# Patient Record
Sex: FEMALE | Race: OTHER | HISPANIC OR LATINO | URBAN - METROPOLITAN AREA
[De-identification: names, ages, dates, MRNs, and addresses within clinical notes are randomized per-mention and may not be internally consistent; named-entity substitution may affect disease eponyms.]

---

## 2020-11-23 ENCOUNTER — APPOINTMENT (EMERGENCY)
Dept: RADIOLOGY | Facility: HOSPITAL | Age: 25
End: 2020-11-23

## 2020-11-23 ENCOUNTER — HOSPITAL ENCOUNTER (EMERGENCY)
Facility: HOSPITAL | Age: 25
Discharge: HOME | End: 2020-11-23
Attending: EMERGENCY MEDICINE

## 2020-11-23 VITALS
SYSTOLIC BLOOD PRESSURE: 123 MMHG | OXYGEN SATURATION: 99 % | WEIGHT: 167.55 LBS | RESPIRATION RATE: 16 BRPM | DIASTOLIC BLOOD PRESSURE: 65 MMHG | TEMPERATURE: 98 F | HEART RATE: 84 BPM

## 2020-11-23 DIAGNOSIS — R10.9 ABDOMINAL PAIN: ICD-10-CM

## 2020-11-23 DIAGNOSIS — Z3A.01 LESS THAN 8 WEEKS GESTATION OF PREGNANCY: Primary | ICD-10-CM

## 2020-11-23 LAB
ALBUMIN SERPL BCP-MCNC: 3.7 G/DL (ref 3.5–5)
ALP SERPL-CCNC: 81 U/L (ref 46–116)
ALT SERPL W P-5'-P-CCNC: 19 U/L (ref 12–78)
ANION GAP SERPL CALCULATED.3IONS-SCNC: 5 MMOL/L (ref 4–13)
AST SERPL W P-5'-P-CCNC: 13 U/L (ref 5–45)
BASOPHILS # BLD AUTO: 0.07 THOUSANDS/ΜL (ref 0–0.1)
BASOPHILS NFR BLD AUTO: 1 % (ref 0–1)
BILIRUB SERPL-MCNC: 0.33 MG/DL (ref 0.2–1)
BILIRUB UR QL STRIP: NEGATIVE
BUN SERPL-MCNC: 8 MG/DL (ref 5–25)
CALCIUM SERPL-MCNC: 9 MG/DL (ref 8.3–10.1)
CHLORIDE SERPL-SCNC: 106 MMOL/L (ref 100–108)
CLARITY UR: CLEAR
CO2 SERPL-SCNC: 26 MMOL/L (ref 21–32)
COLOR UR: YELLOW
COLOR, POC: NORMAL
CREAT SERPL-MCNC: 0.78 MG/DL (ref 0.6–1.3)
EOSINOPHIL # BLD AUTO: 0.28 THOUSAND/ΜL (ref 0–0.61)
EOSINOPHIL NFR BLD AUTO: 2 % (ref 0–6)
ERYTHROCYTE [DISTWIDTH] IN BLOOD BY AUTOMATED COUNT: 11.9 % (ref 11.6–15.1)
EXT PREG TEST URINE: POSITIVE
EXT. CONTROL ED NAV: NORMAL
GFR SERPL CREATININE-BSD FRML MDRD: 105 ML/MIN/1.73SQ M
GLUCOSE SERPL-MCNC: 79 MG/DL (ref 65–140)
GLUCOSE UR STRIP-MCNC: NEGATIVE MG/DL
HCT VFR BLD AUTO: 39.9 % (ref 34.8–46.1)
HGB BLD-MCNC: 12.9 G/DL (ref 11.5–15.4)
HGB UR QL STRIP.AUTO: NEGATIVE
IMM GRANULOCYTES # BLD AUTO: 0.02 THOUSAND/UL (ref 0–0.2)
IMM GRANULOCYTES NFR BLD AUTO: 0 % (ref 0–2)
KETONES UR STRIP-MCNC: NEGATIVE MG/DL
LEUKOCYTE ESTERASE UR QL STRIP: NEGATIVE
LIPASE SERPL-CCNC: 122 U/L (ref 73–393)
LYMPHOCYTES # BLD AUTO: 4 THOUSANDS/ΜL (ref 0.6–4.47)
LYMPHOCYTES NFR BLD AUTO: 34 % (ref 14–44)
MCH RBC QN AUTO: 30.9 PG (ref 26.8–34.3)
MCHC RBC AUTO-ENTMCNC: 32.3 G/DL (ref 31.4–37.4)
MCV RBC AUTO: 96 FL (ref 82–98)
MONOCYTES # BLD AUTO: 1.2 THOUSAND/ΜL (ref 0.17–1.22)
MONOCYTES NFR BLD AUTO: 10 % (ref 4–12)
NEUTROPHILS # BLD AUTO: 6.26 THOUSANDS/ΜL (ref 1.85–7.62)
NEUTS SEG NFR BLD AUTO: 53 % (ref 43–75)
NITRITE UR QL STRIP: NEGATIVE
NRBC BLD AUTO-RTO: 0 /100 WBCS
PH UR STRIP.AUTO: 6 [PH] (ref 4.5–8)
PLATELET # BLD AUTO: 278 THOUSANDS/UL (ref 149–390)
PMV BLD AUTO: 11.3 FL (ref 8.9–12.7)
POTASSIUM SERPL-SCNC: 3.6 MMOL/L (ref 3.5–5.3)
PROT SERPL-MCNC: 7.3 G/DL (ref 6.4–8.2)
PROT UR STRIP-MCNC: NEGATIVE MG/DL
RBC # BLD AUTO: 4.17 MILLION/UL (ref 3.81–5.12)
SODIUM SERPL-SCNC: 137 MMOL/L (ref 136–145)
SP GR UR STRIP.AUTO: 1.01 (ref 1–1.03)
TSH SERPL DL<=0.05 MIU/L-ACNC: 1.93 UIU/ML (ref 0.36–3.74)
UROBILINOGEN UR QL STRIP.AUTO: 0.2 E.U./DL
WBC # BLD AUTO: 11.83 THOUSAND/UL (ref 4.31–10.16)

## 2020-11-23 PROCEDURE — 84443 ASSAY THYROID STIM HORMONE: CPT | Performed by: EMERGENCY MEDICINE

## 2020-11-23 PROCEDURE — 93005 ELECTROCARDIOGRAM TRACING: CPT

## 2020-11-23 PROCEDURE — 76801 OB US < 14 WKS SINGLE FETUS: CPT

## 2020-11-23 PROCEDURE — 99284 EMERGENCY DEPT VISIT MOD MDM: CPT

## 2020-11-23 PROCEDURE — 36415 COLL VENOUS BLD VENIPUNCTURE: CPT | Performed by: EMERGENCY MEDICINE

## 2020-11-23 PROCEDURE — 99282 EMERGENCY DEPT VISIT SF MDM: CPT | Performed by: EMERGENCY MEDICINE

## 2020-11-23 PROCEDURE — 76815 OB US LIMITED FETUS(S): CPT | Performed by: EMERGENCY MEDICINE

## 2020-11-23 PROCEDURE — 83690 ASSAY OF LIPASE: CPT | Performed by: EMERGENCY MEDICINE

## 2020-11-23 PROCEDURE — 85025 COMPLETE CBC W/AUTO DIFF WBC: CPT | Performed by: EMERGENCY MEDICINE

## 2020-11-23 PROCEDURE — 81025 URINE PREGNANCY TEST: CPT | Performed by: EMERGENCY MEDICINE

## 2020-11-23 PROCEDURE — 81003 URINALYSIS AUTO W/O SCOPE: CPT

## 2020-11-23 PROCEDURE — 80053 COMPREHEN METABOLIC PANEL: CPT | Performed by: EMERGENCY MEDICINE

## 2020-11-24 LAB
ATRIAL RATE: 84 BPM
P AXIS: 71 DEGREES
PR INTERVAL: 124 MS
QRS AXIS: 78 DEGREES
QRSD INTERVAL: 74 MS
QT INTERVAL: 348 MS
QTC INTERVAL: 411 MS
T WAVE AXIS: 38 DEGREES
VENTRICULAR RATE: 84 BPM

## 2020-11-24 PROCEDURE — 93010 ELECTROCARDIOGRAM REPORT: CPT | Performed by: INTERNAL MEDICINE

## 2021-02-10 ENCOUNTER — APPOINTMENT (OUTPATIENT)
Dept: LAB | Facility: HOSPITAL | Age: 26
End: 2021-02-10

## 2021-02-10 ENCOUNTER — INITIAL PRENATAL (OUTPATIENT)
Dept: OBGYN CLINIC | Facility: CLINIC | Age: 26
End: 2021-02-10

## 2021-02-10 VITALS
HEIGHT: 66 IN | BODY MASS INDEX: 26.68 KG/M2 | WEIGHT: 166 LBS | SYSTOLIC BLOOD PRESSURE: 107 MMHG | HEART RATE: 102 BPM | DIASTOLIC BLOOD PRESSURE: 72 MMHG

## 2021-02-10 DIAGNOSIS — Z3A.16 16 WEEKS GESTATION OF PREGNANCY: ICD-10-CM

## 2021-02-10 DIAGNOSIS — Z34.92 PREGNANT AND NOT YET DELIVERED IN SECOND TRIMESTER: ICD-10-CM

## 2021-02-10 DIAGNOSIS — Z78.9 LANGUAGE BARRIER: ICD-10-CM

## 2021-02-10 DIAGNOSIS — O09.32 LIMITED PRENATAL CARE IN SECOND TRIMESTER: ICD-10-CM

## 2021-02-10 DIAGNOSIS — O09.32 LIMITED PRENATAL CARE IN SECOND TRIMESTER: Primary | ICD-10-CM

## 2021-02-10 LAB
ABO GROUP BLD: NORMAL
BACTERIA UR QL AUTO: ABNORMAL /HPF
BASOPHILS # BLD AUTO: 0.04 THOUSANDS/ΜL (ref 0–0.1)
BASOPHILS NFR BLD AUTO: 0 % (ref 0–1)
BILIRUB UR QL STRIP: NEGATIVE
BLD GP AB SCN SERPL QL: NEGATIVE
CLARITY UR: ABNORMAL
COLOR UR: YELLOW
EOSINOPHIL # BLD AUTO: 0.11 THOUSAND/ΜL (ref 0–0.61)
EOSINOPHIL NFR BLD AUTO: 1 % (ref 0–6)
ERYTHROCYTE [DISTWIDTH] IN BLOOD BY AUTOMATED COUNT: 12.5 % (ref 11.6–15.1)
GLUCOSE UR STRIP-MCNC: NEGATIVE MG/DL
HBV SURFACE AG SER QL: NORMAL
HCT VFR BLD AUTO: 37.3 % (ref 34.8–46.1)
HGB BLD-MCNC: 12.4 G/DL (ref 11.5–15.4)
HGB UR QL STRIP.AUTO: NEGATIVE
HYALINE CASTS #/AREA URNS LPF: ABNORMAL /LPF
IMM GRANULOCYTES # BLD AUTO: 0.05 THOUSAND/UL (ref 0–0.2)
IMM GRANULOCYTES NFR BLD AUTO: 1 % (ref 0–2)
KETONES UR STRIP-MCNC: NEGATIVE MG/DL
LEUKOCYTE ESTERASE UR QL STRIP: ABNORMAL
LYMPHOCYTES # BLD AUTO: 1.77 THOUSANDS/ΜL (ref 0.6–4.47)
LYMPHOCYTES NFR BLD AUTO: 18 % (ref 14–44)
MCH RBC QN AUTO: 31.6 PG (ref 26.8–34.3)
MCHC RBC AUTO-ENTMCNC: 33.2 G/DL (ref 31.4–37.4)
MCV RBC AUTO: 95 FL (ref 82–98)
MONOCYTES # BLD AUTO: 0.86 THOUSAND/ΜL (ref 0.17–1.22)
MONOCYTES NFR BLD AUTO: 9 % (ref 4–12)
NEUTROPHILS # BLD AUTO: 7.24 THOUSANDS/ΜL (ref 1.85–7.62)
NEUTS SEG NFR BLD AUTO: 71 % (ref 43–75)
NITRITE UR QL STRIP: NEGATIVE
NON-SQ EPI CELLS URNS QL MICRO: ABNORMAL /HPF
NRBC BLD AUTO-RTO: 0 /100 WBCS
PH UR STRIP.AUTO: 6.5 [PH]
PLATELET # BLD AUTO: 258 THOUSANDS/UL (ref 149–390)
PMV BLD AUTO: 11 FL (ref 8.9–12.7)
PROT UR STRIP-MCNC: NEGATIVE MG/DL
RBC # BLD AUTO: 3.93 MILLION/UL (ref 3.81–5.12)
RBC #/AREA URNS AUTO: ABNORMAL /HPF
RH BLD: POSITIVE
RPR SER QL: NORMAL
RUBV IGG SERPL IA-ACNC: 172.8 IU/ML
SP GR UR STRIP.AUTO: 1.01 (ref 1–1.03)
SPECIMEN EXPIRATION DATE: NORMAL
UROBILINOGEN UR QL STRIP.AUTO: 0.2 E.U./DL
WBC # BLD AUTO: 10.07 THOUSAND/UL (ref 4.31–10.16)
WBC #/AREA URNS AUTO: ABNORMAL /HPF

## 2021-02-10 PROCEDURE — 99211 OFF/OP EST MAY X REQ PHY/QHP: CPT

## 2021-02-10 PROCEDURE — 86336 INHIBIN A: CPT

## 2021-02-10 PROCEDURE — 81001 URINALYSIS AUTO W/SCOPE: CPT

## 2021-02-10 PROCEDURE — 36415 COLL VENOUS BLD VENIPUNCTURE: CPT

## 2021-02-10 PROCEDURE — 84702 CHORIONIC GONADOTROPIN TEST: CPT

## 2021-02-10 PROCEDURE — 87086 URINE CULTURE/COLONY COUNT: CPT

## 2021-02-10 PROCEDURE — 82105 ALPHA-FETOPROTEIN SERUM: CPT

## 2021-02-10 PROCEDURE — 87077 CULTURE AEROBIC IDENTIFY: CPT

## 2021-02-10 PROCEDURE — 80081 OBSTETRIC PANEL INC HIV TSTG: CPT

## 2021-02-10 PROCEDURE — 83020 HEMOGLOBIN ELECTROPHORESIS: CPT

## 2021-02-10 PROCEDURE — 82677 ASSAY OF ESTRIOL: CPT

## 2021-02-10 NOTE — LETTER
Proof of Pregnancy Letter    Mirna Rushing Ramon  1995  [address] [address]        02/10/21      Mirna Rushing is a patient at our facility  Tania Muniz Estimated Date of Delivery: 07/24/2021     Any questions or concerns, please feel free to contact our office      Sincerely,    1 [facility]    [address]   [place], [address] [facility]   [phone]

## 2021-02-10 NOTE — LETTER
Dentist Letter    Tania 82  1995  [address] [place] [address] Road [address]          02/10/21    We have had several requests from local dentist requesting permission to perform procedures on our patients who are pregnant  We wish to respond with this letter regarding some of the more routine procedures that we have been asked about  The following procedures may be performed on our obstetric patients:   1  Administration of local anesthesia   2  Administration of antibiotics such as PCN, Ampicillin, and Erythromycin  3  Administration of pain medications such as Tylenol, Tylenol with codeine, and if needed Percocet  4  Shielded X-rays    Should you have any questions, please do not hesitate to contact at [phone]          Sincerely,    1 [facility]   [phone]

## 2021-02-10 NOTE — LETTER
Work Letter    Mirna Serna  [address]  1995  [address] [address]    Dear Alexandra Mcmahon,      02/10/21        Your employee is a patient at [facility]  We recommend that all pregnant women:    1  Have a well-ventilated workspace  2  Wear low-heeled shoes  3  Work no more than 40 hours per week  4  Have a 15 minute break every 2 hours and at least 30 minutes for a meal break  5  Use good body mechanics by bending at your knees to avoid back strain and lift no more than 20 pounds without assistance  Will need assistance with lifting over 20 lbs  6  Have ready access to bathrooms and water  She may continue to work until her due date unless medical complications arise  We anticipate she may return to work in 6-8 weeks after delivery       Sincerely,  1 Tamir Martínez

## 2021-02-10 NOTE — PROGRESS NOTES
OB INTAKE INTERVIEW  Pt presents for OB intake    OB History    Para Term  AB Living   2 1 1     1   SAB TAB Ectopic Multiple Live Births           1      # Outcome Date GA Lbr Gallo/2nd Weight Sex Delivery Anes PTL Lv   2 Current            1 Term 02/20/15 38w0d   F Vag-Spont None N CAROL      Birth Comments: BRAZIL       Obstetric Comments   MENSTRUAL CYCLE: 12     Hx of  delivery prior to 36 weeks 6 days:  No  Last Menstrual Period:    No LMP recorded (lmp unknown)  Patient is pregnant  Ultrasound date: 2020  5 weeks 6 days     Estimated Date of Delivery: 2021  by US  H&P visit scheduled  2021 @ 1:15 pm  with Dr Shay Moss  Last pap smear: No records, will collect at H&P    Current Issues:  Constipation :   No  Headaches :   Yes   Cramping:  No  Spotting :   No  PICA cravings :  No  FOB Involved:   Yes  Planned pregnancy:  Yes  I have these concerns about this prenatal patient:   1  Limited prenatal care in second trimester  ED visit on 2020 which a formal ultrasound was performed  Per ultrasound pt was 5 weeks 6 days with SADIQ of 2021     - Prenatal Panel; Future  - Hemoglobin Electrophoresis; Future  - QUAD Screen; Future    2  16 weeks gestation of pregnancy  - Ambulatory referral to social work care management program; Future  - Ambulatory Referral to Maternal Fetal Medicine; Future   Level 2 (dating): 03/15/2021 @ 2:30 pm     3  Pregnant and not yet delivered in second trimester    4  Language barrier  [place]     Interview education   [facility] Pregnancy Ess[facility] reviewed and discussed    [facility] Handout   [facility] MFM Handout   Discussed genetic testing   Prenatal lab work: Scripts printed and given to pt   Influenza vaccine given today: No   Discussed Tdap vaccine  Immunizations: There is no immunization history on file for this patient    Depression Screening Follow-up Plan: Patient's depression screening was negative with an [place] score of  0  Clinically patient does not have depression  No treatment is required     Nurse/Family Partnership- referral placed:  No  BMI Counseling: Body mass index is 26 79 kg/m²  Tobacco Cessation Counseling: non-smoker  Infection Screening: Does the pt have a hx of MRSA? No  The patient was oriented to our practice and all questions were answered    Interviewed by: Jackie Dodd RN 02/10/21

## 2021-02-10 NOTE — LETTER
[facility] Letter    Mirna Inman [place] Ramon  1995  [address] [address]       02/10/21          Mirna Crespo is a patient and under our care in our office  Brian 54 Estimated Date of Delivery: 07/24/2021  Any questions or concerns feel free to contact our office       Thank you,  [address]  [id number] [facility]/Darian Alfaro 15  [address] [facility]/Patrick Atkins 82  [facility]   400 [facility]  [phone]

## 2021-02-10 NOTE — PATIENT INSTRUCTIONS
1 Tamir Martínez would like to say Congratulations on your pregnancy! We are happy that you have chosen us to be your health care provider during your pregnancy  Your health, the health of your unborn child (children) and your family is important to us  Now, more than ever, your health will be most important to you  Your baby's growth and progress can be affected by how well you take care of yourself  It's a good idea to plan ahead  It is a known fact that women who receive care early in pregnancy and throughout their pregnancy have healthier babies  Visits will be scheduled for you throughout your pregnancy  It is your duty to keep your appointments and follow directions for your care  The number of visits will be decided by your doctor  Don't be afraid to ask questions  Talk with your nurses and providers about your plans for birth and any concerns you may have  Maternal Fetal Medicine (MFM) at [phone] t  o Sequential Screening   (optional)   - Done between 15 - 13 weeks gestation   Ultrasound    Risks for Trisomy 25 and 24   Spina Bifida (second part), after 16 weeks, will be explained by the MFM office  o QUAD screen, if applicable   o Anatomy Scan   - 20 week ultrasound  - Gender may be revealed, your preference  - 1 hour appointment, only 1 support person allowed, NO children   Blood work : Please complete prior to your H&P appointment  o You do NOT have to fast for any blood work unless instructed     - CBC, Blood type and antibody screen, Urinalysis, Random glucose level, HIV, Syphilis, Hepatitis B   IF applicable: 1 hour Glucola or Hemoglobin A1C, 24 hour urine, CMP, Protein creatinine ratio   History &Physical: H&P appointment   o Physical exam  o Pelvic exam: GC/CT testing  o If needed: Pap smear  - Routine prenatal   o Visits every 4 weeks until 28 weeks  o At your prenatal visits we will:   - Check baby's heart beat and measure your growing belly, collect a urine sample, weight, and blood pressure   - 28 weeks:  Prenatal visits will be every 2 weeks  - TDaP vaccine, Complete blood count, RPR  o Blood type and antibodies   - Rhogham may be given at this time, if needed  o Gestational Diabetes, 1 hr Glucola test (non-fasting)  - If you fail the 1 hr Glucola, a 3 hr Glucola will be ordered by your provider  The 3 hr Glucola test is fasting     - If you fail the 3 hr Glucola test, you will be referred to the Maternal Fetal Medicine diabetic education team  They can further assist you by calling [phone]   Start performing daily fetal kick counts   Prenatal classes  o Prepared childbirth Education classes, Breastfeeding, [place] [facility], and Infant/Child CPR  - Call [facility]  [phone] to sign up   - 36 weeks: Prenatal visits start to become weekly  o Group Beta Strep (GBS) will be collected  - This is done by a vaginal swab in the office  o Chlamydia/Gonorrhea testing will be obtained as well, if applicable   Prepare for delivery  - Back your bag and belongings for the hospital  - Familiarize yourself with visiting guidelines   RELAX:  o Enjoy the last few weeks of your pregnancy  Pregnancy   What you need to know about pregnancy:    o A normal pregnancy lasts about 40 weeks  o The first trimester lasts from your last period through the 12th week of pregnancy  o The second trimester lasts from the 13th week of your pregnancy through the 23rd week  o The third trimester lasts from your 24th week of pregnancy until your baby is born  Body changes that may occur during your pregnancy:  1  Breast changes you will experience include tenderness and tingling during the early part of your pregnancy  Your breasts will become larger  You may need to use a support bra  You may see a thin, yellow fluid, called colostrum, leak from your nipples during the second trimester   Colostrum is a liquid that changes to milk about 3 days after you give birth  2  Skin changes and stretch marks  may occur during your pregnancy  You may have red marks, called stretch marks, on your skin  Stretch marks will usually fade after pregnancy  Use lotion if your skin is dry and itchy  The skin on your face, around your nipples, and below your belly button may darken  Most of the time, your skin will return to its normal color after your baby is born  3  Morning sickness  is nausea and vomiting that can happen at any time of day  Avoid fatty and spicy foods  Eat small meals throughout the day instead of large meals  Myrna may help to decrease nausea  Ask your healthcare provider about other ways of decreasing nausea and vomiting  4  Heartburn  may be caused by changes in your hormones during pregnancy  Your growing uterus may also push your stomach upward and force stomach acid to back up into your esophagus  Eat 4 or 5 small meals each day instead of large meals  Avoid spicy foods  Avoid eating right before bedtime  5  Constipation  may develop during your pregnancy  To treat constipation, eat foods high in fiber such as fiber cereals, beans, fruits, vegetables, whole-grain breads, and prune juice  Get regular exercise and drink plenty of water  Your healthcare provider may also suggest a fiber supplement to soften your bowel movements  Talk to your healthcare provider before you use any medicines to decrease constipation  6  Hemorrhoids  are enlarged veins in the rectal area  They may cause pain, itching, and bright red bleeding from your rectum  To decrease your risk of hemorrhoids, prevent constipation and do not strain to have a bowel movement  If you have hemorrhoids, soak in a tub of warm water to ease discomfort  Ask your healthcare provider how you can treat hemorrhoids  7  Leg cramps and swelling  may be caused by low calcium levels or the added weight of pregnancy  Raise your legs above the level of your heart to decrease swelling   During a leg cramp, stretch or massage the muscle that has the cramp  Heat may help decrease pain and muscle spasms  Apply heat on your muscle for 20 to 30 minutes every 2 hours for as many days as directed  8  Back pain  may occur as your baby grows  Do not stand for long periods of time or lift heavy items  Use good posture while you stand, squat, or bend  Wear low-heeled shoes with good support  Rest may also help to relieve back pain  Ask your healthcare provider about exercises you can do to strengthen your back muscles  Stay healthy during your pregnancy     Eat a variety of healthy foods  Healthy foods include fruits, vegetables, whole-grain breads, low-fat dairy foods, beans, lean meats, and fish  Drink liquids as directed  Ask how much liquid to drink each day and which liquids are best for you   Caffeine: It is not clear how caffeine affects pregnancy  Limit your intake of caffeine to avoid possible health problems  o Limit caffeine to less than 200 milligrams each day  - Caffeine may be found in coffee, tea, cola, sports drinks, and chocolate   Foods that contain mercury:  Mercury is naturally found in almost all types of fish and shellfish  Some types of fish absorb higher levels of mercury that can be harmful to an unborn baby  Eat only fish and shellfish that are low in mercury  o Limit your intake of fish to 2 servings each week  - Choose fish low in mercury such as canned light tuna, shrimp, crab, salmon, cod, or tilapia   Do not  eat fish high in mercury such as swordfish, tilefish, cassy mackerel, and shark   - Each week, you may eat up to 12 ounces of fish or shellfish that have low levels of mercury  These include shrimp, canned light tuna, salmon, pollock, and catfish    - Eat only 6 ounces of albacore (white) tuna per week  Albacore tuna has more mercury than canned tuna   Take prenatal vitamins as directed    Your need for certain vitamins and minerals, such as folic acid, increases during pregnancy  Prenatal vitamins provide some of the extra vitamins and minerals you need  Prenatal vitamins may also help to decrease the risk of certain birth defects   Weight: Ask how much weight you should gain during your pregnancy  Too much or too little weight gain can be unhealthy for you and your baby   Exercise: Talk to your healthcare provider about exercise  Moderate exercise can help you stay fit  Your healthcare provider will help you plan an exercise program that is safe for you during pregnancy  o Drink plenty of fluids while exercising to stay hydrated  Be careful to avoid overheating  o AVOID exercises that can make you lose your balance    o Activities that can put your baby at risk i e  horseback riding, scuba diving, skiing or snowboarding  o After your first trimester, avoid exercises that require you to lay flat on your back  o AVOID exceeding a heart rate greater than 140 beats per minute  As long as you are able to hold a conversation while exercising your heart rate is likely acceptable   ALWAYS wear your seat belt   o The shoulder belt should lay across your chest (between your breasts) and away from your neck    o Secure the lap belt below your belly so that it fits snugly across your hips and pelvic bone   Perform Kegel exercise  o Imagine yourself stopping the flow of urine, barbara the muscles of your pelvic floor  Hold that contraction for 10 seconds and release  - They can be repeated 10-20 times per day   Do not smoke  If you smoke, it is never too late to quit  Smoking increases your risk of a miscarriage and other health problems during your pregnancy  Smoking can cause your baby to be born too early or weigh less at birth  Ask your healthcare provider for information if you need help quitting   Do not drink alcohol  Alcohol passes from your body to your baby through the placenta   It can affect your baby's brain development and cause fetal alcohol syndrome (FAS)  FAS is a group of conditions that causes mental, behavior, and growth problems  o Alcohol:  Do not drink alcohol during pregnancy  Alcohol can increase your risk of a miscarriage (losing your baby)   Never use illegal or street drugs (such as marijuana or cocaine) while you are pregnant  Safety tips:   Avoid hot tubs and saunas  Do not use a hot tub or sauna while you are pregnant, especially during your first trimester  Hot tubs and saunas may raise your baby's temperature and increase the risk of birth defects   Avoid toxoplasmosis  This is an infection caused by eating raw meat or being around infected cat feces  It can cause birth defects, miscarriages, and other problems  Wash your hands after you touch raw meat  Make sure any meat is well-cooked before you eat it  Avoid raw eggs and unpasteurized milk  Use gloves or ask someone else to clean your cat's litter box while you are pregnant   Ask your healthcare provider about travel  The most comfortable time to travel is during the second trimester  Ask your healthcare provider if you can travel after 36 weeks  You may not be able to travel in an airplane after 36 weeks  He may also recommend that you avoid long road trips  Pregnancy Diet  WHAT YOU NEED TO KNOW:   What is a healthy diet during pregnancy? A healthy diet during pregnancy is a meal plan that provides the amount of calories and nutrients you need during pregnancy  Your body needs extra calories and nutrients to support your growing baby  You need to gain the right amount of weight for a healthy baby and pregnancy  Babies born at a healthy weight have a lower risk of certain health problems at birth and later in life  A healthy diet may help you avoid gaining too much weight  Too much weight gain may cause problems for you during your pregnancy and delivery   What should I AVOID while I am pregnant?   o Raw and undercooked foods:   You should not eat undercooked or raw meat, poultry, eggs, fish, or shellfish (shrimp, crab, lobster)  Cook leftover foods and ready-to-eat foods such as hot dogs until they are steaming hot    o Unpasteurized food:  Unpasteurized foods are foods that have not gone through the heating process (pasteurization) that destroys bacteria  You should not drink milk, juice, or cheese that has not been pasteurized  This includes Brie, feta, Camembert, blue, and [place] cheeses   Which foods can I eat while I am pregnant? Eat a variety of foods from each of the food groups listed below  Your dietitian will tell you how many servings you should have from each food group each day to get enough calories  The amount of calories you need depends on your daily activity, your weight before pregnancy, and current weight gain  Healthcare providers divide pregnancy into 3 periods of time called trimesters  In the first trimester, you usually do not need extra calories  In the second and third trimesters, most women should eat about 300 extra calories each day   What vitamin and mineral supplements may I need? Your healthcare provider will tell you if you need a supplement and the type you should take  Talk to your healthcare provider before you take any other kind of supplement, including herbal (natural) supplements  o Prenatal vitamins:  Eat a variety of healthy foods, even if you take a prenatal vitamin  If you forget to take your vitamin, do not take double the amount the next day    o Folic acid:  You need at least 600 mcg of folic acid each day before you get pregnant  Folic acid helps to form your baby's brain and spinal cord in early pregnancy  During pregnancy, your daily need for folic acid increases to about 600 mcg  Get folic acid each day by eating citrus fruits and juices, green leafy vegetables, liver, or dried beans   Folic acid is also added to foods such as breakfast cereals, bread products, flour, and pasta    o Iron:  Iron is a mineral the body needs to make hemoglobin, which is a part of red blood cells  Hemoglobin helps your blood carry oxygen from the lungs to the rest of your body  Foods that are good sources of iron are meat, poultry, fish, beans, spinach, and fortified cereals and breads  Your body will absorb iron better from non-meat sources if you have a source of vitamin C at the same time  Drink tea and coffee separately from iron-fortified foods and iron supplements  You need about 30 mg of iron each day during pregnancy  o Calcium and vitamin D:  Women who do not eat dairy products may need a calcium and vitamin D supplement  Talk to your healthcare provider about calcium supplements if you do not regularly eat good sources of calcium  The amount of calcium you need is about 1,300 mg if you are between 15and 25years old and 1,000 mg if you are 23to 48years old   What other healthy guidelines should I follow? o Vegetarians and vegans: If you are a vegetarian or vegan, get enough protein, vitamin B12, and iron during your pregnancy  Some non-meat sources of these nutrients are fortified cereals, nut butters, soy products (tofu and soymilk), nuts, grains, and legumes  These nutrients are also found in eggs and milk products  o Cravings: You may have cravings for certain foods during your pregnancy  Foods that are high in calories, fat, and sugar should not replace healthy food choices  Some women have cravings for unusual substances such as jordan, dirt, laundry starch, ice, and chalk  This condition is called pica  These may lead to health problems such as anemia and cause other health problems  Nausea and Vomiting in Pregnancy  Nausea and vomiting in pregnancy  can happen any time of day  These symptoms usually start before the 9th week of pregnancy, and end by the 14th week (second trimester)  Some women can have nausea and vomiting for a longer time   These symptoms can affect some women throughout the entire pregnancy  Nausea and vomiting do not harm your baby  These symptoms can make it hard for you to do your daily activities   Seek care immediately if:   o You have signs of dehydration  Examples are dark yellow urine, dry mouth and lips, dry  skin, fast heartbeat, and urinating less than usual   o You have severe abdominal pain   o You feel too weak or dizzy to stand up   o You see blood in your vomit or bowel movements  o Contact your healthcare provider if:   o You vomit more than 4 times in 1 day   o You have not been able to keep liquids down for more than 1 day   o You lose more than 2 pounds   o You have a fever   o Your nausea and vomiting continue longer than 14 weeks    o You have questions or concerns about your condition or care  Treatment  for nausea and vomiting in pregnancy is usually not needed  Talk to your healthcare provider if your symptoms do not decrease with the changes suggested below  You may need vitamin B6 and medicine if these changes do not help, or your symptoms become severe  Nutrition changes you can make to manage nausea and vomiting:    Eat small meals throughout the day instead of 3 large meals  You may be more likely to have nausea and vomiting when your stomach is empty  Eat foods that are low in fat and high in protein  Examples are lean meat, beans, turkey, and chicken without the skin  Eat a small snack, such as crackers, dry cereal, or a small sandwich before you go to bed   Eat some crackers or dry toast before you get out of bed in the morning  Get out of bed slowly  Sudden movements could cause you to get dizzy and nauseated   Eat bland foods when you feel nauseated  Examples of bland foods include dry toast, dry cereal, plain pasta, white rice, and bread  Other bland foods include saltine crackers, bananas, gelatin, and pretzels  Avoid spicy, greasy, and fried foods  Avoid any other foods that make you feel nauseated   Drink liquids that contain tee  Drink ginger ale made with real ginger or ginger tea made with fresh grated ginger  Ginger capsules or ginger candies may also help to decrease nausea and vomiting   Drink liquids between meals instead of with meals  Wait at least 30 minutes after you eat to drink liquids  Drink small amounts of liquids often throughout the day to prevent dehydration  Ask how much liquid you should drink each day   Other changes you can make to manage nausea and vomiting:    Avoid smells that bother you  Strong odors may cause nausea and vomiting to start, or make it worse  Take a short walk, turn on a fan, or try to sleep with the window open to get fresh air  When you are cooking, open windows to get rid of smells that may cause nausea   Do not brush your teeth right after you eat  if it makes you nauseated   Rest when you need to  Start activity slowly and work up to your usual routine as you start to feel better   Talk to your healthcare provider about your prenatal vitamins  Prenatal vitamins can cause nausea for some women  Try taking your prenatal vitamin at night or with a snack  If this change does not help, your healthcare provider may recommend a different type of vitamin   Do not use any medicines, vitamins, or supplements to manage your symptoms without asking your healthcare provider  Many medicines can harm an unborn baby   Light to moderate exercise  may help to decrease your symptoms  It may also help you to sleep better at night  Ask your healthcare provider about the best exercise plan for you      Breastfeeding    Benefits of breastfeeding  o Are less likely to develop allergies  o Have increased protection against bacterial meningitis  o Have fewer middle ear infections  o Have fewer learning disabilities  o Have fewer behavioral difficulties  o Have higher IQ's  o Have lower rates of respiratory illness  o Have lower obesity  o Are less likely to develop juvenile diabetes and some childhood cancers  o Are less likely to die from Sudden Infant Death Syndrome  o A healthier baby means fewer visits to the doctor and the pharmacy  o Breastfeeding is environmentally friendly  There is nothing to throw away    o Breastfeeding is free; formula can cost over $1000 for the first year of life  o There is less vaginal bleeding immediately after delivery and a faster return to your pre-pregnant size  - Mothers who breastfeed a lifetime total of 2 years have:   A 40% decreased risk of breast cancer    A decreased risk of ovarian cancer   Lower rates of osteoporosis, diabetes and heart disease   Importance of exclusive breastfeeding  o By providing the ideal food for the healthy growth and development of infants, exclusive  infants receive only breast milk    o Exclusive breastfeeding for the first 6 months is very important to improve an infant's health and reduce childhood illness   Exclusive breastfeeding for the first 6 months  o The American Academy of Pediatrics recommends exclusive breastfeeding for the first six months and continued breastfeeding with supplementation of solids for the next 6 months   Early initiation of breastfeeding  o Women who feed their infants within the first hour of birth is referred to as Juanda Alexei initiation of breastfeeding and ensures that the  receives colostrum  o Colostrum is rich in antibodies and essential nutrients   Rooming-In  o May stabilize 's breathing and heart rate  o Reduce crying  o Improve ability for  to maintain temperature and blood sugar levels  o Early stimulation of baby's immune system  o Calming effects  o Easier and faster bonding   o Rooming-in promotes getting to know your    o Rooming-in makes breastfeeding easier  o Women who room-in with their newborns make more milk and produce a good milk supply earlier    o Becomes easier to recognize baby's feeding cues  o Infants have longer breastfeeding sessions  o Women who room-in with their newborns are more likely to exclusively breastfeed compared to women who are  from their newborns   Skin-to-Skin  o Skin to skin contact should happen regardless of a woman's feeding preference or the mode of delivery  o After the delivery of your baby, it's important that a healthy mother and her baby have uninterrupted skin-to-skin contact   o Skin to skin contact should occur immediately after birth for a least one-two hours and until after the first feeding for breastfeeding mothers  o Skin-to-skin contact means placing dried, unclothes newborns on their mother's bare chest, with warm blankets covering the baby's back  o All routine procedures such as maternal and  assessments can take place during skin-to-skin contact or can be delayed until after the sensitive period immediately after birth  We are proud to offer extensive educational and support services to encourage mothers to breastfeed  This service offers information, education, and support for women who want to breast feed  Mothers can leave a message or breastfeeding question on the line anytime of the day  Nurses will respond within 72 hours  The Breast Feeding Answer Line is [phone] ([phone])  Please DO NOT leave urgent or emergent messages on this message line  Please DO contact your physician DIRECTLY if you have any urgent questions or concerns   In the event of a suspected emergency medical condition please CALL 911 or [address]      Attaching your baby at the Breast (English): [email or web address]    Attaching your baby at the Breast (Faroese):  [email or web address]        Coronavirus (COVID-19) pregnancy FAQs: Medical experts answer your questions  From [employer] cy  com/0_coronavirus-covid-19-pregnancy-faq-medical-pofmgre-utvdfy-cs_08804264  bc (courtesy of [facility])  As of 3/18/20  By Jamal Morin 38  Medically reviewed by Society for Maternal-Fetal Medicine       We asked parents-to-be to send us their most pressing questions about coronavirus (COVID-19)  Among them: Is it still safe to deliver in a hospital? What if my ob-gyn has the virus? We sent those questions to the top docs at the Society for Maternal-Fetal Medicine  Here are their answers  The coronavirus (COVID-19) pandemic has been declared a national emergency in the [facility] by [employer]  Moms-to-be like you are concerned about everything from getting medicines to managing disruptions at work  But above and beyond any worry about lifestyle changes is a focus on your health and the impact of COVID-19 on your pregnancy  We asked obstetrics doctors who handle the most complicated pregnancy issues to answer your questions about the coronavirus  Here are their responses, provided by Dr Gallo Sarah and her colleagues at the [employer]  Am I at more risk for COVID-19 if I'm pregnant? We don't know  Pregnancy does change your immune system in ways that might make you more susceptible to viral respiratory infections like COVID-19  If you become infected, you might also be at higher risk for more severe illness compared to the general population  Although this does not appear to be the case with COVID-19, based on limited data so far, a higher risk has been true for pregnant women with other coronavirus infections (SARS-CoV and MERS-CoV) and other viral respiratory infections, such as flu  It's important to take preventive actions to avoid infection, such as washing your hands often and avoiding people who are sick  How might coronavirus affect my pregnancy? Again, we don't know   Women with other coronavirus infections (such as SARS-CoV) did not have miscarriage or stillbirth at higher rates than the general population  We know that having other respiratory viral infections during pregnancy, such as flu, has been associated with problems like low birth weight and  birth  Also, having a high fever early in pregnancy may increase the risk of certain birth defects  Could I transmit coronavirus to my baby during pregnancy or delivery? We don't know whether you could transmit COVID-19 to your baby before or during delivery  However, among the few case studies of infants born to mothers with COVID-23 published in peer-reviewed literature, none of the infants tested positive for the virus  Also, there was no virus detected in samples of amniotic fluid or breast milk  There have been a few reports of newborns as young as a few days old with infection, suggesting that a mother can transmit the infection to her infant through close contact after delivery  There have been no reports of mother-to-baby transmission for other coronaviruses (MERS-CoV and SARS-CoV), although only limited information is available  Is it safe for me to deliver at a hospital where there have been COVID-19 cases? Yes  We know that COVID-19 is a very scary virus  The good news is that hospitals are taking great precautions to keep patients and healthcare providers safe  When a patient is even suspected to have COVID-19, they are placed in a negative pressure room  (Think of these rooms as vacuums that suck and filter the air so it's safe for the other people in the hospital)  This makes it possible for you to deliver at the hospital without putting you or your baby at risk  Hospitals are also implementing stricter visiting policies to keep patients safe  It's worth calling your hospital to check if there are any new regulations to be aware of      What plans should I make now in case the hospital system is overwhelmed when it's time for me to deliver? This is a great question to talk with your doctor or midwife about when you're 34 to 36 weeks pregnant  Every hospital is making different plans for dealing with this scenario  I work in healthcare  Should I ask my doctor to excuse me from work until the baby is born? What if I work in a school, the travel [employer], or some other high-risk setting? Healthcare facilities should take care to limit the exposure of pregnant employees to patients with confirmed or suspected COVID-19, just as they would with other infectious cases  If you continue working, be sure to follow the CDC's risk assessment and infection control guidelines  If you work in a school, travel [employer], or other high-risk setting, talk with your employer about what it's doing to protect employees and minimize infection risks  Wash your hands often  What if my OB gets COVID-19? If your doctor or midwife tests positive for COVID-19, they will need to be quarantined until they recover and are no longer at risk of transmitting the virus  In this case, you'll be assigned to another OB in your doctor's practice (or you may choose another practitioner yourself)  Ask your new OB or your doctor's office if you should self-quarantine or be tested for the virus  (It will depend on when you last saw your provider and when that person tested positive )    Should we hold off on trying to conceive because of COVID-19? At this time, there's no reason to hold off on trying to get pregnant, but the data we have is really limited  For example, we don't think the virus causes birth defects or increases your risk of miscarriage  But we don't know whether you could transmit COVID-19 to your baby before or during delivery  We also don't know if the virus lives in semen or can be sexually transmitted  We have a babymoon scheduled in the next few months - should we cancel?   If you're planning to travel internationally or on a cruise, you should strongly consider canceling  At this time, the virus has reached more than 140 countries, and there are travel bans to [place], most of [place], and [place]  Places where large numbers of people gather are at highest risk, especially airports and cruise ships  If you're planning travel in the [place] , note that any travel setting increases your risk of exposure, and there may be travel bans even in [place] by the time you're scheduled to go  Even if you're state is not blocked, you'll definitely want to avoid traveling to communities where the virus is spreading  To find out where these places are, check The [place] Times map based on CDC data  For the most current advice to help you avoid exposure, check the CDC's COVID-19 travel page  Will the hospital separate me from my  and keep the baby in quarantine? If you test positive for COVID-19 or have been exposed but have no symptoms, the CDC, [employer] of Obstetricians and Gynecologists, and the [employer] [place] [employer] all recommend that you be  from your baby to decrease the risk of transmission to the baby  This would last until you are no longer at risk of transmitting the virus  If you do not have COVID-19 and have not been exposed to the virus, the hospital will not separate you from your   My hospital is restricting visitors and only allowing one support person  If my support person leaves after the delivery, will they be allowed to come back? Every hospital has different policies  Contact your hospital or labor and delivery unit a week or so before delivery to get the most up-to-date restrictions  In general, if your support person needs to leave, they would be allowed back unless they knew they were exposed to COVID-19 after leaving your company  [facility] understands that the coronavirus (COVID-19) pandemic is an evolving story and that your questions will change over time   We'll continue asking moms and dads in our Community what they want to know, and we'll get the answers from experts to keep them - and you - informed and supported  My mom was planning to fly here to help me care for my new baby after delivery  Should I tell her not to come? Yes  If your mom is over 61 or has any serious chronic medical conditions (such as heart disease, lung disease, or diabetes), she is at higher risk of serious illness from COVID-19 and should avoid air travel  And remember that any travel setting increases a person's risk of exposure  So, it may be risky to have her around the baby after she has been traveling  For the most current advice on traveling, check the [facility]'s COVID-19 travel page  [facility] understands that the coronavirus pandemic is an evolving story and that your questions will change over time  We'll continue asking moms and dads in our Community what they want to know, and we'll get the answers from experts to keep them - and you - informed and supported

## 2021-02-11 ENCOUNTER — PATIENT OUTREACH (OUTPATIENT)
Dept: OBGYN CLINIC | Facility: CLINIC | Age: 26
End: 2021-02-11

## 2021-02-11 ENCOUNTER — ROUTINE PRENATAL (OUTPATIENT)
Dept: OBGYN CLINIC | Facility: CLINIC | Age: 26
End: 2021-02-11

## 2021-02-11 VITALS
SYSTOLIC BLOOD PRESSURE: 107 MMHG | DIASTOLIC BLOOD PRESSURE: 68 MMHG | HEIGHT: 66 IN | WEIGHT: 166 LBS | HEART RATE: 99 BPM | BODY MASS INDEX: 26.68 KG/M2

## 2021-02-11 DIAGNOSIS — Z34.92 PREGNANT AND NOT YET DELIVERED IN SECOND TRIMESTER: ICD-10-CM

## 2021-02-11 DIAGNOSIS — Z3A.17 17 WEEKS GESTATION OF PREGNANCY: Primary | ICD-10-CM

## 2021-02-11 LAB — HIV 1+2 AB+HIV1 P24 AG SERPL QL IA: NORMAL

## 2021-02-11 PROCEDURE — G0145 SCR C/V CYTO,THINLAYER,RESCR: HCPCS | Performed by: OBSTETRICS & GYNECOLOGY

## 2021-02-11 PROCEDURE — 99203 OFFICE O/P NEW LOW 30 MIN: CPT | Performed by: OBSTETRICS & GYNECOLOGY

## 2021-02-11 PROCEDURE — 87491 CHLMYD TRACH DNA AMP PROBE: CPT | Performed by: OBSTETRICS & GYNECOLOGY

## 2021-02-11 PROCEDURE — 87591 N.GONORRHOEAE DNA AMP PROB: CPT | Performed by: OBSTETRICS & GYNECOLOGY

## 2021-02-11 NOTE — PROGRESS NOTES
OB/GYN  PRENATAL H&P VISIT  Mirna Cintron  2021  2:54 PM  Dr Anjum Hoffman DO    I conducted this interview in [address] [place] [address] with the assistance of [address] [place] [address]  Patient is a  at 17w2d by 1st trimester ultrasound in the ER and unknown LMP here for initial prenatal H&P  This is a surprise but desired pregnancy  She is currently doing well  She does not work  She used to work as a cook in [employer]     She denies hx of STD/STI, denies a hx of TB or close contacts with persons with TB  She has never had MRSA  She denies a family history of inheritable conditions such as physical or intellectual disabilities, birth defects, blood disorders, heart or neural tube defects  She denies recent travel or travel planned in the near future  She denies use of nicotine or recreational drug use  She denies use of ETOH  She denies vaginal bleeding, cramping, leakage, abnormal discharge  OB History    Para Term  AB Living   2 1 1 0 0 1   SAB TAB Ectopic Multiple Live Births   0 0 0 0 1      # Outcome Date GA Lbr Gallo/2nd Weight Sex Delivery Anes PTL Lv   2 Current            1 Term 02/20/15 38w0d   F Vag-Spont None N CAROL      Birth Comments: BRAZIL       Obstetric Comments   MENSTRUAL CYCLE: 12       Review of Systems   Constitutional: Negative  HENT: Negative  Eyes: Negative  Respiratory: Negative  Negative for apnea and chest tightness  Cardiovascular: Negative  Negative for chest pain and leg swelling  Gastrointestinal: Negative  Endocrine: Negative  Genitourinary: Negative  Negative for vaginal bleeding and vaginal discharge  Musculoskeletal: Negative  Psychiatric/Behavioral: Negative          Past Medical History:   Diagnosis Date    Anemia     Spontaneous vaginal delivery 2015       Past Surgical History:   Procedure Laterality Date    NO PAST SURGERIES         Social History     Socioeconomic History    Marital status: /Civil Union     Spouse name: Not on file    Number of children: 1    Years of education: 15    Highest education level: High school graduate   Occupational History    Not on file   Social Needs    Financial resource strain: Hard    Food insecurity     Worry: Never true     Inability: Never true    Transportation needs     Medical: No     Non-medical: No   Tobacco Use    Smoking status: Never Smoker    Smokeless tobacco: Never Used   Substance and Sexual Activity    Alcohol use: Not Currently     Frequency: Never     Binge frequency: Never    Drug use: Never    Sexual activity: Yes     Partners: Male     Birth control/protection: None   Lifestyle    Physical activity     Days per week: 0 days     Minutes per session: 0 min    Stress: Not at all   Relationships    Social connections     Talks on phone: More than three times a week     Gets together: Once a week     Attends Caoda[employer] service: More than 4 times per year     Active member of club or organization: No     Attends meetings of clubs or organizations: Never     Relationship status: Living with partner    Intimate partner violence     Fear of current or ex partner: No     Emotionally abused: No     Physically abused: No     Forced sexual activity: No   Other Topics Concern    Not on file   Social History Narrative    Not on file       OBJECTIVE  Vitals:    02/11/21 1326   BP: 107/68   Pulse: 99     Physical Exam  Constitutional:       Appearance: She is normal weight  HENT:      Mouth/Throat:      Mouth: Mucous membranes are moist    Neck:      Musculoskeletal: Normal range of motion  Cardiovascular:      Rate and Rhythm: Normal rate and regular rhythm  Pulses: Normal pulses  Heart sounds: Normal heart sounds  No murmur  No gallop  Pulmonary:      Effort: Pulmonary effort is normal  No respiratory distress  Breath sounds: Normal breath sounds  No stridor  Chest:      Chest wall: No tenderness  Abdominal:      General: Abdomen is flat  Palpations: Abdomen is soft  Mass: gravid  Tenderness: There is no abdominal tenderness  There is no guarding  Neurological:      General: No focal deficit present  Mental Status: She is alert and oriented to person, place, and time  Skin:     General: Skin is warm and dry  Psychiatric:         Mood and Affect: Mood normal          Behavior: Behavior normal    Vitals signs reviewed  ASSESSMENT AND PLAN    31 y o , , with unknown LMP and POC 1st trimester US in ER, at 17w2d here for her prenatal H&P  Bedside biometry was done today and fetus is measuring between 17 to 18 weeks  Formal ultrasound to follow with [facility]    FHT 150s by dopplers    Pregnancy: H&P completed today  PN Labs reviewed today  She is A pos, will not require Rhogam  Labor expectations discussed with patient, including appointment schedule, nutrition, exercise, medications, sexual intercourse, and nausea/vomiting  Patient's BMI is 26  Recommended weight gain is 15-25 lbs during pregnancy  Screening: Pap smear collected  GC/CT collected  Some slight oozing of blood noted from cervix afterwards, patient counseled that she may have spotting  Sequential screening reviewed with patient - will proceed with Quad screen as it is too late for sequential screen    Consents: Delivery process including potential OVD with both vacuum and forceps as well as  reviewed  Sign delivery consent form at 28 weeks  Labor: For analgesia, patient plans on no epidural, she did not have one with her last pregnancy  Contraception: Different methods of contraception were discussed with patient, including progesterone only oral pills, depo provera, nexplanon, mirena, and paragard  Patient would like to use either Nexplanon or IUD, but needs to use the Mirena IUD Arch program during postpartum phase  Follow up: RTC in 4 weeks   Precautions regarding labor, leakage, bleeding, and fetal movement reviewed      Anjum Mcadams DO  2/11/2021  2:54 PM

## 2021-02-11 NOTE — PROGRESS NOTES
SW spoke with 30 y/o- S-G2:P1- [facility] Bermudian/ Frisian speaking for pre  assessment  Pt resides with FOB, 10 y/o daughter and brother  Pt reported pregnancy was intended and both are happy  Pt denies any usage of drug, alcohol or smoking  Pt denies any usage of drug, alcohol or smoking  Pt denies any mental health or domestic violence history  Pt is unemployed but denies financial concern  Pt has [address] [facility] [address] and [address] [facility]   Pt reported her sister in law drives to her appointments  Pt claimed FOB, and relatives are supportive  Pt denies question or concern at this time  SW explained her role and gave contact information  Pt was encouraged to call SW at any time needed

## 2021-02-11 NOTE — PATIENT INSTRUCTIONS
Embarazo   LO QUE USTED DEBE SABER:   Un embarazo normal dura alrededor de 36 semanas desde luna último periodo  El primer trimestre dura de luna último periodo hasta la semana [id number]  El jade trimestre dura de la semana 14 de embarazo hasta la semana 26  El tercer trimestre dura desde la semana 32 de embarazo hasta el día en que nace luna bebé  Si usted conoce la fecha de luna último periodo, luna médico puede calcular la fecha de nacimiento de luna bebé  Es posible que usted dé a jerald a luna bebé en cualquier momento antes de 2 semanas antes ó 2 semanas después de la fecha de nacimiento  INSTRUCCIONES:   Medicamentos:  · Vitaminas prenatales: Estas le ayudarán a obtener vitaminas y minerales suficientes  Las vitaminas también pueden disminuir el riesgo de ciertas malformaciones congénitas  · [place] torsten medicamentos sonia se le haya indicado  Llame a luna proveedor de thony si piensa que luna medicamento no le está ayudando o tiene efectos secundarios  Infórmele si es alérgico a algún medicamento  Mantenga marco lista de torsten medicamentos, vitaminas, y hierbas que está tomando  Incluya la cantidad que cherie, la Simeon shepherd, y por qué las cherie  Traiga la lista o las botellas de las píldoras a torsten visitas de seguimiento  Lleve siempre consigo marco lista de torsten medicamentos en mauri de emergencia  Tenga seguimiento con luna médico de cabecera u obstetra según indicado: Vaya a todas torsten citas prenatales lisandro luna embarazo  Anote torsten preguntas para que recuerde preguntarlas en torsten citas  Manténgase saludable lisandro luna embarazo:  · Coma marco variedad de alimentos saludables: Alimentos saludables [employer] frutas, verduras, panes íntegros, productos lácteos bajos en grasa, frijoles, chantal [place], y pescado  Pregunte si usted necesita tener marco dieta especial      · [place] bastantes líquidos: [place] por lo menos ocho (8 onzas o 237 ml) tazas de líquidos saludables diariamente  Líquidos saludables [address], [address] [facility] líquidos que contienen cafeína, sonia el café, té y [place]  No primitivo alcohol  · Consulte con luna médico de cabecera antes de britt algún medicamento: Muchos medicamentos pueden causar daño permanente a luna bebé si usted los cherie mientras esté [place]  Consulte con luna médico de cabecera antes de britt cualquier medicamento, medicamentos de venta khurram, vitaminas, herbales, o suplemento sin consultar con luna médico audi  No use drogas ilícitas o de la escoto (sonia la marihuana o cocaína) mientras esté Y[place]onna Cashing  Consulte con luna médico de cabecera si se le hace difícil dejar de usar drogas de la escoto  · Ejercicio: Pregúntele a luna médico de cabecera acerca del mejor programa de ejercicio para usted  El ejercicio puede ayudarla a que se sienta mejor y ayudar a que luna trabajo de parto y parto shane más fáciles  · No fume: Si usted fuma, nunca es tarde para dejar de hacerlo  El fumar puede ser peligroso para luna bebé y causar que él pese menos al nacer  Pídale información a luna médico de cabecera si usted necesita ayuda para dejar de fumar  Consejos de seguridad:  · Evite jacuzzis y saunas: No se bañe en un jacuzzi o use un sauna si usted está embarazada, especialmente lisandro el primer trimestre  Los [employer] y los saunas aumentan la temperatura de luna bebé y el riesgo de malformaciones congénitas  · Sexo: Usted puede tener sexo [employer] momento en que comience luna [place], a menos que [place] Melissa con luna Hyacinth [place]  Use condones lisandro el sexo si está en riesgo de contraer marco enfermedad de transmisión sexual (ETS)  Las ETS pueden ser peligrosas para usted y luna bebé  No tenga relaciones sexuales si usted está sangrando de luna vagina o tiene dolor en luna abdomen o vagina  · Viaje con seguridad: El tiempo más placentero para viajar es lisandro el jade trimestre (en los meses cuatro a seis)  Consejos para viajar incluyen lo siguiente:     ? Por avión: Pida un asiente en el pasillo  [place] le facilitará levantarse para ir al baño y para ir a caminar cada hora  ? [place] a evitar: Es posible que usted necesite evitar visitar lugares de gran elevación, especialmente después de luna [place]  No viaje a lugares que tienen cuidado médico deficiente y en donde el agua no esté tratada  Consulte con luna médico de cabecera antes de viajar fuera del país  · Evite toxoplasmosis: [place] es marco infección causada por comer carne cruda o estar cerca de excremento de un radha infectado  [place] puede causar malformaciones congénitas, aborto y [place] torsten herrera después de tocar carne cruda  Asegúrese de que toda carne esté josé cocida antes de comerla  Evite huevos crudos y [place] sin Karel Gough  Use guantes o pida que alguien la ayude a limpiar la caja de arena del radha mientras usted [place]  Comuníquese con luna médico de cabecera u obstetra:  · Usted tiene escalofríos o fiebre  · Usted tiene comezón vaginal, quemazón o dolor  · Usted tiene marco secreción acuosa que se escapa de luna vagina  · Usted tiene marco secreción vaginal amarilla, cynthia, cuajada y [place], o con mal olor  · Usted tiene marbella de [place] frecuentemente o marbella de isabel que no desaparece  · Usted tiene dolor o quemazón cuando [facility] menos de lo usual, o tiene [place] rosada o con buck  · Usted está sintiendo contracciones regulares frecuentemente  · Usted tiene preguntas o inquietudes acerca de luna condición o cuidados  Regrese a la suzette de emergencia si:  · Usted tiene dolor torácico      · Usted tiene un dolor punzante en la espalda inferior  · Usted repentinamente tiene hinchazón en luna kathryn, dedos, brazos, tobillos, o pies  · Usted tiene [place] o visión nebulosa o disminuida  · Usted tiene más de 20 semanas de embarazo y patino sufrido algún trauma, aún sin sufrir marco lesión  · Usted siente parte de luna bebé o el cordón umbilical en luna vagina      · Usted tiene sangrado vaginal   © 2014 [address] is for End User's use only and may not be sold, redistributed or otherwise used for commercial purposes  All illustrations and images included in CareNotes® are the copyrighted property of A D A M , Inc  or Dusty López  Esta información es sólo para uso en educación  Luna intención no es darle un consejo médico sobre enfermedades o tratamientos  Colsulte con luna Bessie Merl farmacéutico antes de seguir cualquier régimen médico para saber si es seguro y efectivo para usted

## 2021-02-12 LAB
2ND TRIMESTER 4 SCREEN SERPL-IMP: NORMAL
2ND TRIMESTER 4 SCREEN SERPL-IMP: NORMAL
AFP ADJ MOM SERPL: 1.35
AFP SERPL-MCNC: 44.3 NG/ML
AGE AT DELIVERY: 27.5 YR
BACTERIA UR CULT: ABNORMAL
C TRACH DNA SPEC QL NAA+PROBE: NEGATIVE
DEPRECATED HGB OTHER BLD-IMP: 0 %
FET TS 18 RISK FROM MAT AGE: NORMAL
FET TS 21 RISK FROM MAT AGE: 892
GA METHOD: NORMAL
GA: 16.4 WEEKS
HCG ADJ MOM SERPL: 1.18
HCG SERPL-ACNC: NORMAL MIU/ML
HGB A MFR BLD: 2.3 % (ref 1.8–3.2)
HGB A MFR BLD: 97.1 % (ref 96.4–98.8)
HGB C MFR BLD: 0 %
HGB F MFR BLD: 0.6 % (ref 0–2)
HGB FRACT BLD-IMP: NORMAL
HGB S BLD QL SOLY: NEGATIVE
HGB S MFR BLD: 0 %
IDDM PATIENT QL: NO
INHIBIN A ADJ MOM SERPL: 0.88
INHIBIN A SERPL-MCNC: 135.86 PG/ML
KARYOTYP BLD/T: NORMAL
MULTIPLE PREGNANCY: NO
N GONORRHOEA DNA SPEC QL NAA+PROBE: NEGATIVE
NEURAL TUBE DEFECT RISK FETUS: 4151 %
SERVICE CMNT-IMP: NORMAL
TS 18 RISK FETUS: NORMAL
TS 21 RISK FETUS: NORMAL
U ESTRIOL ADJ MOM SERPL: 1.84
U ESTRIOL SERPL-MCNC: 1.86 NG/ML

## 2021-02-17 ENCOUNTER — TELEPHONE (OUTPATIENT)
Dept: PERINATAL CARE | Facility: CLINIC | Age: 26
End: 2021-02-17

## 2021-02-17 LAB
LAB AP GYN PRIMARY INTERPRETATION: NORMAL
Lab: NORMAL
PATH INTERP SPEC-IMP: NORMAL

## 2021-02-17 NOTE — TELEPHONE ENCOUNTER
Spoke with patient regarding rescheduling her for 3/15 @ 2:30pm to same day at 1pm   Patient confirmed

## 2021-03-15 ENCOUNTER — ROUTINE PRENATAL (OUTPATIENT)
Dept: PERINATAL CARE | Facility: CLINIC | Age: 26
End: 2021-03-15

## 2021-03-15 VITALS
BODY MASS INDEX: 27.8 KG/M2 | WEIGHT: 173 LBS | DIASTOLIC BLOOD PRESSURE: 58 MMHG | SYSTOLIC BLOOD PRESSURE: 113 MMHG | HEART RATE: 95 BPM | HEIGHT: 66 IN

## 2021-03-15 DIAGNOSIS — Z36.86 ENCOUNTER FOR ANTENATAL SCREENING FOR CERVICAL LENGTH: ICD-10-CM

## 2021-03-15 DIAGNOSIS — Z36.3 ENCOUNTER FOR ANTENATAL SCREENING FOR MALFORMATION: Primary | ICD-10-CM

## 2021-03-15 DIAGNOSIS — Z3A.21 21 WEEKS GESTATION OF PREGNANCY: ICD-10-CM

## 2021-03-15 PROCEDURE — 76805 OB US >/= 14 WKS SNGL FETUS: CPT | Performed by: OBSTETRICS & GYNECOLOGY

## 2021-03-15 PROCEDURE — 76817 TRANSVAGINAL US OBSTETRIC: CPT | Performed by: OBSTETRICS & GYNECOLOGY

## 2021-03-15 PROCEDURE — 99241 PR OFFICE CONSULTATION NEW/ESTAB PATIENT 15 MIN: CPT | Performed by: OBSTETRICS & GYNECOLOGY

## 2021-03-15 NOTE — LETTER
March 17, 2021     Nish Blunt DO  [facility] [address]    Patient: Zena Kang   YOB: 1995   Date of Visit: 3/15/2021       Dear Dr Agueda Camargo:    Thank you for referring Sharita Sam to me for evaluation  Below are my notes for this consultation  If you have questions, please do not hesitate to call me  I look forward to following your patient along with you  Sincerely,        Anita Estes MD        CC: No Recipients  Anita Estes MD  3/17/2021  9:33 AM  Sign when Signing Visit  [address] [place] [address] Road: Ms Sima Gerard was seen today at [address] [place] [address] for anatomic survey and cervical length screening ultrasound  See ultrasound report under "OB Procedures" tab  Please don't hesitate to contact our office with any concerns or questions    Anita Estes MD

## 2021-03-15 NOTE — PATIENT INSTRUCTIONS
Thank you for choosing us for your  care today  If you have any questions about your ultrasound or care, please do not hesitate to contact us or your primary obstetrician  Some general instructions for your pregnancy are:     Protect against coronavirus: Continue to practice social distancing, wear a mask, and wash your hands often  Pregnant women are increased risk of severe COVID  Notify your primary care doctor if you have any symptoms including cough, shortness of breath or difficulty breathing, fever, chills, muscle pain, sore throat, or loss of taste or smell  Pregnant women can receive the coronavirus vaccine   Exercise: Aim for 22 minutes per day (150 minutes per week) of regular exercise  Walking is great!  Nutrition: aim for calcium-rich and iron-rich foods as well as healthy sources of protein   Protect against the flu: get yourself and your entire household vaccinated against influenza  This will protect your baby   Learn about Preeclampsia: preeclampsia is a common, serious high blood pressure complication in pregnancy  A blood pressure of 967INOQ (systolic or top number) or 66WUPZ (diastolic or bottom number) is not normal and needs evaluation by your doctor   If you smoke, try to reduce how many cigarettes you smoke or try to quit completely  Do not vape   Other warning signs to watch out for in pregnancy or postpartum: chest pain, obstructed breathing or shortness of breath, seizures, thoughts of hurting yourself or your baby, bleeding, a painful or swollen leg, fever, or headache (see AWH[facility] POST-BIRTH Warning Signs campaign)  If these happen call 911  Itching is also not normal in pregnancy and if you experience this, especially over your hands and feet, potentially worse at night, notify your doctors     Lastly, if you are contacted regarding participation in a survey about your experience in our office, please know that we take any feedback you provide seriously and use it to improve how we deliver care through our center

## 2021-03-16 PROBLEM — Z3A.22 22 WEEKS GESTATION OF PREGNANCY: Status: ACTIVE | Noted: 2021-02-11

## 2021-03-16 NOTE — PROGRESS NOTES
OB/GYN prenatal visit    S: 31 y o  Brendan Torres 22w1d here for PN visit  She has no obstetric complaints, including pelvic pain, contractions, vaginal bleeding, loss of fluid, or decreased fetal movement  O:  Vitals:    21 1509   BP: 106/65   Pulse: 93       Gen: no acute distress, nonlabored breathing  Fundal Height (cm): 22 cm  Fetal Heart Rate: 155    A/P:      IUP at 22w1d  No obstetric complaints today  [facility] anterior placenta, no anatomic abnormalities  return in 12 weeks for repeat growth ultrasound  TWG: + 9lbs (recommended weight gain 15-25lbs)  Flu vaccine not this season, TDAP vaccine at 28 weeks  Contraception: Desires "most effective form," of contraception, considering  LARC, patient is self pay and would be able to use [facility] program postpartum  She reports that she would be more interested in a hormonal IUD, will plan for postpartum Mirena  She would be interested in a Depo bridge  Breastfeeding: Yes  Birth plan: desires epidural, is afraid of having too much pain  Discussed  labor precautions and fetal kick counts  Reviewed that she should present to [facility] for any obstetric or other emergencies during pregnancy  Reviewed that [facility] does not have any obstetric care at this time    Return to clinic in 4 weeks    COVID 19 precautions were discussed with patient at length, reviewed symptoms, hygiene, social distancing, patient to call office  with questions/concerns    Discussed with Dr Delvalle Record  Jesús ([id number]) used    Isabella Shirley MD  3/17/2021  3:30 PM

## 2021-03-17 ENCOUNTER — ROUTINE PRENATAL (OUTPATIENT)
Dept: OBGYN CLINIC | Facility: CLINIC | Age: 26
End: 2021-03-17

## 2021-03-17 VITALS
HEART RATE: 93 BPM | SYSTOLIC BLOOD PRESSURE: 106 MMHG | DIASTOLIC BLOOD PRESSURE: 65 MMHG | BODY MASS INDEX: 28.42 KG/M2 | WEIGHT: 176.8 LBS | HEIGHT: 66 IN

## 2021-03-17 DIAGNOSIS — Z34.82 ENCOUNTER FOR SUPERVISION OF OTHER NORMAL PREGNANCY IN SECOND TRIMESTER: Primary | ICD-10-CM

## 2021-03-17 DIAGNOSIS — Z3A.22 22 WEEKS GESTATION OF PREGNANCY: ICD-10-CM

## 2021-03-17 LAB
SL AMB  POCT GLUCOSE, UA: NEGATIVE
SL AMB LEUKOCYTE ESTERASE,UA: NEGATIVE
SL AMB POCT BILIRUBIN,UA: NEGATIVE
SL AMB POCT BLOOD,UA: NEGATIVE
SL AMB POCT KETONES,UA: NEGATIVE
SL AMB POCT NITRITE,UA: NEGATIVE
SL AMB POCT URINE PROTEIN: NEGATIVE

## 2021-03-17 PROCEDURE — 99213 OFFICE O/P EST LOW 20 MIN: CPT | Performed by: OBSTETRICS & GYNECOLOGY

## 2021-03-17 PROCEDURE — 81002 URINALYSIS NONAUTO W/O SCOPE: CPT | Performed by: OBSTETRICS & GYNECOLOGY

## 2021-03-17 NOTE — PROGRESS NOTES
[address]: Ms Rosa Ferguson was seen today at [address] [place] [address] for anatomic survey and cervical length screening ultrasound  See ultrasound report under "OB Procedures" tab  Please don't hesitate to contact our office with any concerns or questions    David Palma MD

## 2021-03-17 NOTE — PATIENT INSTRUCTIONS
[facility] for any medical concerns

## 2021-04-09 DIAGNOSIS — Z23 ENCOUNTER FOR IMMUNIZATION: ICD-10-CM

## 2021-04-15 ENCOUNTER — ROUTINE PRENATAL (OUTPATIENT)
Dept: OBGYN CLINIC | Facility: CLINIC | Age: 26
End: 2021-04-15

## 2021-04-15 VITALS
BODY MASS INDEX: 28.61 KG/M2 | HEART RATE: 106 BPM | SYSTOLIC BLOOD PRESSURE: 126 MMHG | WEIGHT: 178 LBS | HEIGHT: 66 IN | DIASTOLIC BLOOD PRESSURE: 60 MMHG

## 2021-04-15 DIAGNOSIS — Z3A.22 22 WEEKS GESTATION OF PREGNANCY: Primary | ICD-10-CM

## 2021-04-15 DIAGNOSIS — E04.9 ENLARGED THYROID: ICD-10-CM

## 2021-04-15 PROCEDURE — 99213 OFFICE O/P EST LOW 20 MIN: CPT | Performed by: OBSTETRICS & GYNECOLOGY

## 2021-04-15 NOTE — PROGRESS NOTES
OB/GYN  PN Visit  Tania Muniz  [id number]  4/15/2021  2:53 PM      S: Pt is a 31 y o   woman at 26w2d  by 5w U/S here for PN visit  She endorses rare contractions  She denies leakage of fluid and vaginal bleeding  She endorses good fetal movement  Her pregnancy is uncomplicated  Plan: breast / Interval Mirena IUD with Depo bridge    O:  Vitals:    04/15/21 1400   BP: 126/60   Pulse: (!) 106     Physical Exam  Constitutional:       Appearance: Normal appearance  Neck:      Comments: Thyroid visibly enlarged, no tenderness to palpation, uniformly enlarged, firm, no discrete nodules, mobile with swallowing  Cardiovascular:      Rate and Rhythm: Normal rate and regular rhythm  Heart sounds: Normal heart sounds  No murmur  No friction rub  No gallop  Pulmonary:      Effort: Pulmonary effort is normal  No respiratory distress  Breath sounds: Normal breath sounds  No stridor  No wheezing, rhonchi or rales  Abdominal:      General: There is no distension  Palpations: Abdomen is soft  There is no mass  Tenderness: There is no abdominal tenderness  There is no guarding  Comments: gravid   Musculoskeletal:         General: No swelling or tenderness  Skin:     Findings: No rash  Neurological:      Mental Status: She is alert         Fundal height: 27cm         A/P:  Problem List Items Addressed This Visit        Other    22 weeks gestation of pregnancy - Primary      Other Visit Diagnoses     Enlarged thyroid        Relevant Orders    TSH, 3rd generation with Free T4 reflex            Future Appointments   Date Time Provider Kayleen Corona   2021  1:00 PM  US 2 [address] [place] [address],      Get TSH, can consider thyroid U/S if still enlarged at next visit  RTC in 4 weeks      Samuel Cochran MD  OB/GYN PGY-1  4/15/2021  2:53 PM

## 2021-05-06 ENCOUNTER — ROUTINE PRENATAL (OUTPATIENT)
Dept: OBGYN CLINIC | Facility: CLINIC | Age: 26
End: 2021-05-06

## 2021-05-06 ENCOUNTER — PATIENT OUTREACH (OUTPATIENT)
Dept: OBGYN CLINIC | Facility: CLINIC | Age: 26
End: 2021-05-06

## 2021-05-06 VITALS
BODY MASS INDEX: 28.77 KG/M2 | DIASTOLIC BLOOD PRESSURE: 72 MMHG | WEIGHT: 179 LBS | HEART RATE: 91 BPM | SYSTOLIC BLOOD PRESSURE: 110 MMHG | HEIGHT: 66 IN

## 2021-05-06 DIAGNOSIS — Z34.90 ENCOUNTER FOR PRENATAL CARE: Primary | ICD-10-CM

## 2021-05-06 DIAGNOSIS — Z3A.29 29 WEEKS GESTATION OF PREGNANCY: ICD-10-CM

## 2021-05-06 PROCEDURE — 90715 TDAP VACCINE 7 YRS/> IM: CPT

## 2021-05-06 PROCEDURE — 90471 IMMUNIZATION ADMIN: CPT

## 2021-05-06 PROCEDURE — 99214 OFFICE O/P EST MOD 30 MIN: CPT | Performed by: STUDENT IN AN ORGANIZED HEALTH CARE EDUCATION/TRAINING PROGRAM

## 2021-05-06 NOTE — PATIENT INSTRUCTIONS
El embarazo de la semana 27 a la 30   CUIDADO AMBULATORIO:   Qué cambios están ocurriendo en luna cuerpo: Usted podría notar síntomas nuevos sonia falta de [place] o inflamación de torsten tobillos y pies  Es posible que también tenga dificultad para dormir o contracciones  Busque atención médica de inmediato si:  · Usted presenta un lamont dolor de isabel que no desaparece  · Usted tiene cambios en la visión nuevos o en aumento, sonia visión borrosa o con manchas  · Usted tiene inflamación nueva o creciente en luna kathryn o herrera  · Usted tiene manchado o sangrado vaginal     · Usted rompe raiza o siente un chorro o gotas de agua tibia que le está bajando por luna vagina  Comuníquese con luna médico si:  · Usted tiene más de 5 contracciones en 1 hora  · Usted nota algún cambio en los movimientos de luna bebé  · Usted tiene calambres, presión o tensión abdominal     · Usted tiene un cambio en la secreción vaginal     · Usted tiene escalofríos o fiebre  · Usted tiene comezón, ardor o dolor vaginal     · Usted tiene marco secreción vaginal amarillenta, verdosa, julian o de [employer]  · Usted tiene dolor o ardor al Darnella Cowing, orina menos de lo habitual o tiene [place] rosada o sanguinolenta  · Usted tiene preguntas o inquietudes acerca de luna condición o cuidado  Cómo cuidarse en esta etapa de luna embarazo:  · Consuma alimentos saludables y variados  Alimentos saludables incluyen frutas, verduras, panes de kristina integral, alimentos lácteos bajos en grasa, frijoles, chantal magras y pescado  [place] líquidos sonia se le haya indicado  Pregunte cuánto líquido debe britt cada día y cuáles líquidos son los más adecuados para usted  Limite el consumo de cafeína a menos de [place] [address]  Limite el consumo de pescado a 2 porciones cada semana  Escoja pescado con concentraciones bajas de maryuri sonia atún al natural enlatado, camarón, salmón, bacalao o tilapia   No coma pescado con concentraciones altas de maryuri sonia pez haim, caballa gigante, pargo rayado y tiburón  · Controle la acidez comiendo 4 o 5 comidas pequeñas cada día en vez de comidas grandes  Evite los alimentos picantes  · Controle la inflamación al [employer] [facility] [employer] y poner los pies en alto o elevados sobre [place]  · [address] [place]  [place] necesidad de ciertas vitaminas y [address] [facility], sonia el ácido fólico, aumenta lisandro el [facility]  Las vitaminas prenatales proporcionan algunas de las vitaminas y minerales adicionales que usted necesita  Las vitaminas prenatales también podrían ayudar a disminuir el riesgo de ciertos defectos de nacimiento  · Consulte con garnett médico acerca de hacer ejercicio  El ejercicio moderado puede ayudarla a mantenerse en forma  Garnett médico la ayudará a planear un programa de ejercicios que sea seguro para usted lisandro garnett [facility]  · No fume  Fumar aumenta el riesgo de aborto espontáneo y otros problemas de thony lisandro garnett [facility]  Fumar puede causar que garnett bebé nazca antes de tiempo o que pese menos al nacer  Solicite información a garnett médico si usted necesita ayuda para dejar de fumar  · No consuma alcohol  El alcohol pasa de garnett cuerpo al bebé a través de la placenta  Puede afectar el desarrollo del cerebro de garnett bebé y provocar el síndrome de alcoholismo fetal (SAF)  El SAF es un jessica de condiciones que causan problemas [place], de comportamiento y de crecimiento  · Consulte con garnett médico antes de britt cualquier medicamento  Muchos medicamentos pueden perjudicar a garnett bebé si usted los cherie [address] [place] [address]  No tome ningún medicamento, vitaminas, hierbas o suplementos sin audi consultar con garnett Desi Castro  use drogas ilegales o de la escoto (sonia marihuana o cocaína) mientras está embarazada  Consejos de seguridad lisandro el embarazo:  · Evite jacuzzis y saunas   No use un jacuzzi o un sauna mientras usted está Joey Gardner, especialmente lisandro el primer trimestre  [facility] y los saunas aumentan la temperatura de luna bebé y el riesgo de defectos de nacimiento  · Evite la toxoplasmosis  [place] es marco infección causada por comer carne cruda o estar cerca del excremento de un radha infectado  [place] puede causar malformaciones congénitas, aborto espontáneo y Avila Schein  Lávese las herrera después de tocar carne cruda  Asegúrese de que la carne esté josé cocida antes de comerla  Evite los huevos crudos y la [place]  Use guantes o pida que alguien la ayude a limpiar la caja de arena del radha mientras usted Manuelita Paddy  Cambios que están ocurriendo con luna bebé: Para las 30 semanas, luna bebé podría pesar más de 3 libras  Luna bebé podría medir alrededor de 11 pulgadas de hazel desde la punta de la isabel hasta la rabadilla (parte inferior del bebé)  Ahora luna bebé puede abrir y cerrar torsten ojos  Las patadas y movimientos de luna bebé son más shayrn en stephen momento  Lo que necesita saber acerca del cuidado prenatal: Luna médico le revisará luna presión arterial y [place]  Es posible que también necesite lo siguiente:  · Los análisis de buck podrían realizarse para revisar signos de anemia o el tipo de Shy  · Un examen de orina también podría realizarse para revisarle el azúcar y la proteína  Estas son señales de diabetes gestacional o de infección  La proteína en luna orina también podría ser marco señal de preeclampsia  La preeclampsia es marco condición que puede desarrollarse lisandro la semana 21 o después en luna embarazo  Esta provoca presión arterial yoselin y [employer] con torsten riñones y [place]  · Marco vacuna contra difteria, tétanos y tos ferina y vacuna contra la gripe podría ser recomendado por luna médico     · La detección de diabetes gestacional se realizará usando marco prueba oral de tolerancia a la glucosa   Marco prueba oral de tolerancia a la glucosa comienza con marco revisión de los niveles de azúcar en la Shy después de que usted no haya comido por 8 horas  Después se le da marco bebida de glucosa  Le revisan el nivel de azúcar en la buck después de 1 hora, 2 horas y algunas veces 3 horas  Los médicos analizarán si el nivel de azúcar en la buck aumenta después del primer análisis  · La altura uterina es marco medición del útero para controlar el desarrollo de luna bebé  [employer] por lo general es igual al número de [address] que usted tiene de embarazo  Luna médico también podría revisar la posición de luna bebé  · El ritmo cardíaco de luna bebé será revisado  © Copyright [facility] [address] Information is for End User's use only and may not be sold, redistributed or otherwise used for commercial purposes  All illustrations and images included in CareNotes® are the copyrighted property of [employer]  or [address] [place] [address] es sólo para uso en educación  Luna intención no es darle un consejo médico sobre enfermedades o tratamientos  Colsulte con luna [place] farmacéutico antes de seguir cualquier régimen médico para saber si es seguro y efectivo para usted

## 2021-05-06 NOTE — PROGRESS NOTES
MAYELIN COLIN met with Pt and Provider to assist with interpretation  Pt reported leakage of water yesterday with no pain and positive fetal movement  Pt was examined  Everything negative  Pt got tdap  Today and was advice to get 28 weeks lab and to be prepare to stay an hour  Pt verbalized understanding  Denies other concerns  Will return in 2 weeks

## 2021-05-06 NOTE — PROGRESS NOTES
OB/GYN prenatal visit    Patient seen with Opal Ray for translation    S: 31 y o  Jordy Campos 29w2d here for PN visit  She has denies pelvic pain, contractions, vaginal bleeding, and decreased fetal movement  Patient reports feeling increased leaking yesterday and had discharge "run down my leg"      O:  Vitals:    05/06/21 1234   BP: 110/72   Pulse: 91       Physical Exam  Vitals signs and nursing note reviewed  Exam conducted with a chaperone present  Constitutional:       General: She is not in acute distress  Appearance: She is well-developed  HENT:      Head: Normocephalic and atraumatic  Eyes:      Conjunctiva/sclera: Conjunctivae normal    Neck:      Musculoskeletal: Neck supple  Cardiovascular:      Rate and Rhythm: Normal rate  Pulmonary:      Effort: Pulmonary effort is normal  No respiratory distress  Breath sounds: Normal breath sounds  Abdominal:      Palpations: Abdomen is soft  Tenderness: There is no abdominal tenderness  Comments: Fundal height 29cm  Gravid nontender uterus   Genitourinary:     General: Normal vulva  Vagina: No vaginal discharge  Comments: Sterile speculum exam: Cervix appears long and closed  White thin physiologic discharge in posterior vaginal vault  No pooling or vaginal bleeding  Microscopic evaluation of discharge negative for ferning, clue cells, trichomonas, yeast  Skin:     General: Skin is warm and dry  Neurological:      Mental Status: She is alert           A/P:      IUP at 29w2d  - 28 week labs ordered - stressed importance of completing 1h gtt (No hx GDM)  - Will sign delivery consent at next visit (unable to sign today due to time constraints)  - Blood type: A positive, RhoGAM not indicated  - PNC US - interval growth scan 6/7/21  - TWG: + 5 194kg  - Flu vaccine not indicated at this time, TDAP vaccine given today  - COVID vaccine - received 1st dose, second dose on May 20  - Contraception: Mirena IUD - needs [facility] program  - Breastfeeding: yes  - Birth plan: planning for epidural  - Discussed  labor precautions and fetal kick counts      Return to clinic in 2 weeks    D/w Dr Clau Ventura MD  2021  10:50 AM

## 2021-05-20 ENCOUNTER — APPOINTMENT (OUTPATIENT)
Dept: LAB | Facility: HOSPITAL | Age: 26
End: 2021-05-20

## 2021-05-20 ENCOUNTER — ROUTINE PRENATAL (OUTPATIENT)
Dept: OBGYN CLINIC | Facility: CLINIC | Age: 26
End: 2021-05-20

## 2021-05-20 VITALS
HEIGHT: 66 IN | BODY MASS INDEX: 29.25 KG/M2 | SYSTOLIC BLOOD PRESSURE: 105 MMHG | WEIGHT: 182 LBS | HEART RATE: 98 BPM | DIASTOLIC BLOOD PRESSURE: 57 MMHG

## 2021-05-20 DIAGNOSIS — E04.9 ENLARGED THYROID: ICD-10-CM

## 2021-05-20 DIAGNOSIS — Z34.93 PRENATAL CARE IN THIRD TRIMESTER: Primary | ICD-10-CM

## 2021-05-20 DIAGNOSIS — Z3A.29 29 WEEKS GESTATION OF PREGNANCY: ICD-10-CM

## 2021-05-20 DIAGNOSIS — Z3A.31 31 WEEKS GESTATION OF PREGNANCY: ICD-10-CM

## 2021-05-20 DIAGNOSIS — Z34.90 ENCOUNTER FOR PRENATAL CARE: ICD-10-CM

## 2021-05-20 LAB
ERYTHROCYTE [DISTWIDTH] IN BLOOD BY AUTOMATED COUNT: 12.9 % (ref 11.6–15.1)
GLUCOSE 1H P 50 G GLC PO SERPL-MCNC: 140 MG/DL (ref 40–134)
HCT VFR BLD AUTO: 35.7 % (ref 34.8–46.1)
HGB BLD-MCNC: 11.8 G/DL (ref 11.5–15.4)
MCH RBC QN AUTO: 32.2 PG (ref 26.8–34.3)
MCHC RBC AUTO-ENTMCNC: 33.1 G/DL (ref 31.4–37.4)
MCV RBC AUTO: 98 FL (ref 82–98)
PLATELET # BLD AUTO: 255 THOUSANDS/UL (ref 149–390)
PMV BLD AUTO: 10.1 FL (ref 8.9–12.7)
RBC # BLD AUTO: 3.66 MILLION/UL (ref 3.81–5.12)
TSH SERPL DL<=0.05 MIU/L-ACNC: 1.94 UIU/ML (ref 0.36–3.74)
WBC # BLD AUTO: 10.25 THOUSAND/UL (ref 4.31–10.16)

## 2021-05-20 PROCEDURE — 85027 COMPLETE CBC AUTOMATED: CPT

## 2021-05-20 PROCEDURE — 84443 ASSAY THYROID STIM HORMONE: CPT

## 2021-05-20 PROCEDURE — 36415 COLL VENOUS BLD VENIPUNCTURE: CPT

## 2021-05-20 PROCEDURE — 82950 GLUCOSE TEST: CPT

## 2021-05-20 PROCEDURE — 99213 OFFICE O/P EST LOW 20 MIN: CPT | Performed by: STUDENT IN AN ORGANIZED HEALTH CARE EDUCATION/TRAINING PROGRAM

## 2021-05-20 PROCEDURE — 86592 SYPHILIS TEST NON-TREP QUAL: CPT

## 2021-05-20 NOTE — PROGRESS NOTES
Pt is a 26yo  at 31w2d presenting for routine prenatal visit  She denies vaginal bleeding, loss of fluid, contractions  Reports fetal movement  She just came from having her 28wk labs drawn  Pt has no complaints today, she has her 2nd COVID vaccine scheduled for today at 1:30PM at the [facility]  #[id number]    1) Continue routine prenatal care  28wk labs in process- f/u  Delivery consent signed today  F/U MFM appointment on 21 for fetal growth and missed anatomy  PTL precautions reviewed  Discussed common side effects after covid vaccine that she may or may not experience     RTO in 2 weeks    Moe Miranda MD  OBGYN, PGY-4  2021 12:02 PM

## 2021-05-21 LAB — RPR SER QL: NORMAL

## 2021-05-26 ENCOUNTER — TELEPHONE (OUTPATIENT)
Dept: OBGYN CLINIC | Facility: CLINIC | Age: 26
End: 2021-05-26

## 2021-05-26 DIAGNOSIS — O99.810 ABNORMAL GLUCOSE TOLERANCE TEST (GTT) DURING PREGNANCY, ANTEPARTUM: Primary | ICD-10-CM

## 2021-05-26 NOTE — TELEPHONE ENCOUNTER
----- Message from Moraima Ribera MD sent at 5/26/2021  9:31 AM EDT -----  Please call patient and let her know that she had an elevated 1h gtt and will need 3h gtt

## 2021-06-07 ENCOUNTER — ULTRASOUND (OUTPATIENT)
Dept: PERINATAL CARE | Facility: CLINIC | Age: 26
End: 2021-06-07

## 2021-06-07 VITALS
HEIGHT: 66 IN | HEART RATE: 83 BPM | BODY MASS INDEX: 30.05 KG/M2 | SYSTOLIC BLOOD PRESSURE: 101 MMHG | DIASTOLIC BLOOD PRESSURE: 62 MMHG | WEIGHT: 187 LBS

## 2021-06-07 DIAGNOSIS — O99.810 ABNORMAL GLUCOSE IN PREGNANCY, ANTEPARTUM: Primary | ICD-10-CM

## 2021-06-07 DIAGNOSIS — Z3A.33 33 WEEKS GESTATION OF PREGNANCY: ICD-10-CM

## 2021-06-07 DIAGNOSIS — Z36.3 ENCOUNTER FOR ANTENATAL SCREENING FOR MALFORMATION: ICD-10-CM

## 2021-06-07 PROCEDURE — 99213 OFFICE O/P EST LOW 20 MIN: CPT | Performed by: OBSTETRICS & GYNECOLOGY

## 2021-06-07 PROCEDURE — 76816 OB US FOLLOW-UP PER FETUS: CPT | Performed by: OBSTETRICS & GYNECOLOGY

## 2021-06-07 NOTE — LETTER
June 7, 2021     Kenan Gomez DO  [facility] [address]    Patient: Jagdeep [place]   YOB: 1995   Date of Visit: 6/7/2021       Dear Dr Tristan Romero:    Thank you for referring Loreto Mayberry to me for evaluation  Below are my notes for this consultation  If you have questions, please do not hesitate to call me  I look forward to following your patient along with you  Sincerely,        Sriram Hernandez MD        CC: No Recipients  Sriram Hernandez MD  6/7/2021 10:14 PM  Sign when Signing Visit  Jagdeep [place] has no complaints today  She reports regular fetal movements and does not report any problems  She is here today at 33w6d for an ultrasound for Fetal growth and review of missed anatomy  She has failed her 1 hour Glucola and needs to complete a 3 hour  I used the language line  number [id number] to complete this consult  She reports her prior baby weighed 3600 grams which is 7 pounds 15 ounces in as the same father is this pregnancy she feels this pregnancy is measuring bigger though than her prior pregnancy  She has gained 19 pounds in pregnancy  Ultrasound findings: The ultrasound today shows normal interval fetal growth and fluid  Overall the fetal weight is in the 84th percentile with a fetal abdomen in the 86th percentile  Her baby is measuring 2 weeks ahead of dates  The amniotic fluid appears normal in amount    Pregnancy ultrasound has limitations and is unable to detect all forms of fetal congenital abnormalities  The inaccuracy in the EFW can be off by 1 lb either way in the third trimester  Follow up recommended:   1  I encouraged her to complete her 3 hour glucose tolerance test and will plan 1 further growth scan in 4 weeks      Pre visit time reviewing her records   5 minutes  Face to face time 18 minutes  Post visit time on documentation of note, updating her problem list, adding orders and prescriptions 5 minutes  Procedures that were completed today were charged separately  The level of decision making was moderate complexity      Guy Howe MD

## 2021-06-08 ENCOUNTER — ROUTINE PRENATAL (OUTPATIENT)
Dept: OBGYN CLINIC | Facility: CLINIC | Age: 26
End: 2021-06-08

## 2021-06-08 VITALS
DIASTOLIC BLOOD PRESSURE: 70 MMHG | SYSTOLIC BLOOD PRESSURE: 104 MMHG | HEART RATE: 100 BPM | BODY MASS INDEX: 30.12 KG/M2 | RESPIRATION RATE: 16 BRPM | WEIGHT: 186.6 LBS

## 2021-06-08 DIAGNOSIS — Z3A.34 34 WEEKS GESTATION OF PREGNANCY: ICD-10-CM

## 2021-06-08 DIAGNOSIS — R73.09 ELEVATED GLUCOSE TOLERANCE TEST: ICD-10-CM

## 2021-06-08 DIAGNOSIS — Z34.93 PRENATAL CARE, THIRD TRIMESTER: Primary | ICD-10-CM

## 2021-06-08 PROCEDURE — 99213 OFFICE O/P EST LOW 20 MIN: CPT | Performed by: OBSTETRICS & GYNECOLOGY

## 2021-06-08 NOTE — PROGRESS NOTES
OB/GYN prenatal visit    S: 31 y o  Miguel Rock 34w0d here for PN visit  She has no obstetric complaints, including pelvic pain, contractions, vaginal bleeding, loss of fluid, or decreased fetal movement  O:  Vitals:    06/08/21 1051   BP: 104/70   BP Location: Right arm   Patient Position: Sitting   Cuff Size: Standard   Pulse: 100   Resp: 16   Weight: 84 6 kg (186 lb 9 6 oz)       Gen: no acute distress, nonlabored breathing  Cardio: RRR, S1/S2 normal   Lungs: CTAB, good effort   Abd: soft, nontender  Gravid uterus  Fundal Height (cm): 34 cm  Fetal Heart Rate: 160    A/P:  IUP @ 34w0d   - EFW 2669g (84%) (6/7); repeat growth in 4 weeks (7/8)  - Continue PNV  - BP's WNL thus far this pregnancy   - Received COVID vaccine - feeling well   - GBS at next week     Elevated 1 hr GTT  - Will complete 3 hour  Reminded pt     Postpartum Contraception  - Desires postpartum IUD - [facility] program   - Will have SW CM reach out to patient     RTC in 2 weeks for routine prenatal care      Emilia Ortiz MD  6/8/2021  1:40 PM

## 2021-06-08 NOTE — PATIENT INSTRUCTIONS
El embarazo de la semana 31 a la 34   CUIDADO AMBULATORIO:   Qué cambios están ocurriendo en luna cuerpo: Es posible que usted continúe teniendo síntomas tales sonia falta de [place], [place], contracciones o inflamación en torsten tobillos y pies  Usted podría estar aumentando 1 nori por semana ahora  Busque atención médica de inmediato si:  · Usted presenta un lamont dolor de isabel que no desaparece  · Usted tiene cambios en la visión nuevos o en aumento, sonia visión borrosa o con manchas  · Usted tiene inflamación nueva o creciente en luna kathryn o herrera  · Usted tiene manchado o sangrado vaginal     · Usted rompe raiza o siente un chorro o gotas de agua tibia que le está bajando por luna vagina  Comuníquese con luna médico si:  · Usted tiene más de 5 contracciones en 1 hora  · Usted nota algún cambio en los movimientos de luna bebé  · Usted tiene calambres, presión o tensión abdominal     · Usted tiene un cambio en la secreción vaginal     · Usted tiene escalofríos o fiebre  · Usted tiene comezón, ardor o dolor vaginal     · Usted tiene marco secreción vaginal amarillenta, verdosa, julian o de [employer]  · Usted tiene dolor o ardor al [facility], orina menos de lo habitual o tiene [facility] rosada o sanguinolenta  · Usted tiene preguntas o inquietudes acerca de luna condición o cuidado  Cómo cuidarse en esta etapa de luna embarazo:  · Consuma alimentos saludables y variados  Alimentos saludables incluyen frutas, verduras, panes de kristina integral, alimentos lácteos bajos en grasa, frijoles, chantal magras y pescado  [place] líquidos sonia se le haya indicado  Pregunte cuánto líquido debe britt cada día y cuáles líquidos son los más adecuados para usted  Limite el consumo de cafeína a menos de [address]  Limite el consumo de pescado a 2 porciones cada semana  Escoja pescado con concentraciones bajas de maryuri sonia atún al natural enlatado, camarón, salmón, bacalao o tilapia   No coma pescado con concentraciones altas de maryuri sonia pez haim, caballa gigante, pargo rayado y tiburón  · Controle la acidez comiendo 4 o 5 comidas pequeñas cada día en vez de comidas grandes  Evite los alimentos picantes  · Controle la inflamación al [place] y poner los pies en alto o elevados sobre [place]  · [id number] [place]  Luna necesidad de ciertas vitaminas y [address] [facility], sonia el ácido fólico, aumenta lisandro el [facility]  Las vitaminas prenatales proporcionan algunas de las vitaminas y minerales adicionales que usted necesita  Las vitaminas prenatales también podrían ayudar a disminuir el riesgo de ciertos defectos de nacimiento  · Consulte con luna médico acerca de hacer ejercicio  El ejercicio moderado puede ayudarla a mantenerse en forma  Luna médico la ayudará a planear un programa de ejercicios que sea seguro para usted lisandro luna [facility]  · No fume  Fumar aumenta el riesgo de aborto espontáneo y otros problemas de thony lisandro luna [facility]  Fumar puede causar que luna bebé nazca antes de tiempo o que pese menos al nacer  Solicite información a luna médico si usted necesita ayuda para dejar de fumar  · No consuma alcohol  El alcohol pasa de luna cuerpo al bebé a través de la placenta  Puede afectar el desarrollo del cerebro de luna bebé y provocar el síndrome de alcoholismo fetal (SAF)  El SAF es un jessica de condiciones que causan problemas [place], de comportamiento y de crecimiento  · Consulte con luna médico antes de britt cualquier medicamento  Muchos medicamentos pueden perjudicar a luna bebé si usted los cherie [address] [place] [address]  No tome ningún medicamento, vitaminas, hierbas o suplementos sin audi consultar con luna [place]  use drogas ilegales o de la escoto (sonia marihuana o cocaína) mientras está embarazada  Consejos de seguridad lisandro el embarazo:  · Evite jacuzzis y saunas   No use un jacuzzi o un sauna mientras usted está [place], especialmente lisandro el primer trimestre  Los [employer] y los saunas aumentan la temperatura de garnett bebé y el riesgo de defectos de nacimiento  · Evite la toxoplasmosis  [place] es marco infección causada por comer carne cruda o estar cerca del excremento de un radha infectado  [place] puede causar malformaciones congénitas, aborto espontáneo y [place]ila Schein  Lávese las herrera después de tocar carne cruda  Asegúrese de que la carne esté josé cocida antes de comerla  Evite los huevos crudos y la [place]  Use guantes o pida que alguien la ayude a limpiar la caja de arena del radha mientras usted Karole Laundry  Cambios que están ocurriendo con garnett bebé: Para las 34 semanas, garnett bebé podría pesar más de [address]  Garnett bebé medirá alrededor de 12 ½ pulgadas de hazel desde el praveen de la isabel hasta la rabadilla (parte inferior del bebé)  Garnett bebé está aumentando alrededor de ½ nori por semana  Ahora garnett bebé puede abrir y cerrar torsten ojos  Las patadas y movimientos de garnett bebé son más sharyn en stephen momento  Lo que necesita saber acerca del cuidado prenatal: Garnett médico le revisará garnett presión arterial y [place]  Es posible que también necesite lo siguiente:  · Un examen de orina también podría realizarse para revisarle el azúcar y la proteína  Estas son señales de diabetes gestacional o de infección  La proteína en garnett orina también podría ser marco señal de preeclampsia  La preeclampsia es marco condición que puede desarrollarse lisandro la semana 21 o después en garnett embarazo  Esta provoca presión arterial yoselin y [employer] con torsten riñones y [place]  · La vacuna Tdap podría ser recomendado por garnett médico     · La altura uterina es marco medición del útero para controlar el desarrollo de garnett bebé  [employer] por lo general es igual al número de [address] que usted tiene de embarazo  Garnett médico también podría revisar la posición de garnett bebé  · El ritmo cardíaco de garnett bebé será revisado      © Copyright Mama [address] [facility] Information is for [employer] use only and may not be sold, redistributed or otherwise used for commercial purposes  All illustrations and images included in CareNotes® are the copyrighted property of A D A M , Inc  or [address] [place] [address] es sólo para uso en educación  Luna intención no es darle un consejo médico sobre enfermedades o tratamientos  Colsulte con luna Jamey Gan farmacéutico antes de seguir cualquier régimen médico para saber si es seguro y efectivo para usted

## 2021-06-08 NOTE — PROGRESS NOTES
Tania Muniz has no complaints today  She reports regular fetal movements and does not report any problems  She is here today at 33w6d for an ultrasound for Fetal growth and review of missed anatomy  She has failed her 1 hour Glucola and needs to complete a 3 hour  I used the language line  number [id number] to complete this consult  She reports her prior baby weighed 3600 grams which is 7 pounds 15 ounces in as the same father is this pregnancy she feels this pregnancy is measuring bigger though than her prior pregnancy  She has gained 19 pounds in pregnancy  Ultrasound findings: The ultrasound today shows normal interval fetal growth and fluid  Overall the fetal weight is in the 84th percentile with a fetal abdomen in the 86th percentile  Her baby is measuring 2 weeks ahead of dates  The amniotic fluid appears normal in amount    Pregnancy ultrasound has limitations and is unable to detect all forms of fetal congenital abnormalities  The inaccuracy in the EFW can be off by 1 lb either way in the third trimester  Follow up recommended:   1  I encouraged her to complete her 3 hour glucose tolerance test and will plan 1 further growth scan in 4 weeks  Pre visit time reviewing her records   5 minutes  Face to face time 18 minutes  Post visit time on documentation of note, updating her problem list, adding orders and prescriptions 5 minutes  Procedures that were completed today were charged separately  The level of decision making was moderate complexity      Samantha Butts MD

## 2021-06-11 ENCOUNTER — APPOINTMENT (OUTPATIENT)
Dept: LAB | Facility: HOSPITAL | Age: 26
End: 2021-06-11

## 2021-06-11 DIAGNOSIS — O99.810 ABNORMAL GLUCOSE TOLERANCE TEST (GTT) DURING PREGNANCY, ANTEPARTUM: ICD-10-CM

## 2021-06-11 LAB
GLUCOSE 1H P 100 G GLC PO SERPL-MCNC: 156 MG/DL (ref 65–179)
GLUCOSE 2H P 100 G GLC PO SERPL-MCNC: 141 MG/DL (ref 65–154)
GLUCOSE 3H P 100 G GLC PO SERPL-MCNC: 103 MG/DL (ref 65–139)
GLUCOSE P FAST SERPL-MCNC: 83 MG/DL (ref 65–99)

## 2021-06-11 PROCEDURE — 82951 GLUCOSE TOLERANCE TEST (GTT): CPT

## 2021-06-11 PROCEDURE — 82952 GTT-ADDED SAMPLES: CPT

## 2021-06-11 PROCEDURE — 36415 COLL VENOUS BLD VENIPUNCTURE: CPT

## 2021-06-22 ENCOUNTER — ROUTINE PRENATAL (OUTPATIENT)
Dept: OBGYN CLINIC | Facility: CLINIC | Age: 26
End: 2021-06-22

## 2021-06-22 VITALS
BODY MASS INDEX: 29.89 KG/M2 | DIASTOLIC BLOOD PRESSURE: 69 MMHG | HEIGHT: 66 IN | HEART RATE: 99 BPM | WEIGHT: 186 LBS | SYSTOLIC BLOOD PRESSURE: 96 MMHG

## 2021-06-22 DIAGNOSIS — Z34.93 PRENATAL CARE IN THIRD TRIMESTER: Primary | ICD-10-CM

## 2021-06-22 DIAGNOSIS — Z3A.36 36 WEEKS GESTATION OF PREGNANCY: ICD-10-CM

## 2021-06-22 PROCEDURE — 87150 DNA/RNA AMPLIFIED PROBE: CPT | Performed by: NURSE PRACTITIONER

## 2021-06-22 PROCEDURE — 99213 OFFICE O/P EST LOW 20 MIN: CPT | Performed by: NURSE PRACTITIONER

## 2021-06-22 NOTE — PROGRESS NOTES
Tania Muniz presents today for routine OB visit at 36w0d  [facility]  [id number] used  Blood Pressure: 96/69  Wt=84 4 kg (186 lb); Body mass index is 30 02 kg/m² ; TWG=8 369 kg (18 lb 7 2 oz)  Fetal Heart Rate: 150; Fundal Height (cm): 37 cm  Abdomen: gravid, soft, non-tender  Denies uterine contractions  Denies vaginal bleeding or leaking of fluid  Reports adequate fetal movement of at least 10 movements in 2 hours once daily  Her 1 hour GTT was 140 and her fasting 3 hour GTT was normal   Her CBC showed hemoglobin was 11 8   GBS culture done today  Will call results patient  Artur Bustamante IUD through arch program  Ultrasound 06/07/2021, vertex, fetal weight in the 86 percentile, baby measuring 2 weeks ahead of dates, has follow-up growth scan in 4 weeks 7/8/2021  Reviewed premature labor precautions , preeclampsia precautionsand fetal kick counts  Advised to continue medications and return in 1 weeks  Has received her 2 COVID-19  Vaccines with no side effects         Current Outpatient Medications   Medication Instructions    Prenatal MV-Min-Fe Fum-FA-DHA (PRENATAL 1 PO) 1 tablet, Oral, Daily         Pregnancy Problems (from 02/10/21 to present)     Problem Noted Resolved    Prenatal care, third trimester 6/8/2021 by Kris Riggins MD No    Elevated glucose tolerance test 6/8/2021 by Kris Riggins MD No    34 weeks gestation of pregnancy 2/11/2021 by Izaiah Smith DO No    Overview Signed 2/11/2021  8:51 AM by Izaiah Smith DO     Late to care at 17 weeks

## 2021-06-22 NOTE — PATIENT INSTRUCTIONS
Pregnancy at 28 to 38 Weeks   AMBULATORY CARE:   What changes are happening to your body: You are considered full term at the beginning of 37 weeks  Your breathing may be easier if your baby has moved down into a head-down position  You may need to urinate more often because the baby may be pressing on your bladder  You may also feel more discomfort and get tired easily  Seek care immediately if:   · You develop a severe headache that does not go away  · You have new or increased vision changes, such as blurred or spotted vision  · You have new or increased swelling in your face or hands  · You have vaginal spotting or bleeding  · Your water broke or you feel warm water gushing or trickling from your vagina  Contact your healthcare provider if:   · You have more than 5 contractions in 1 hour  · You notice any changes in your baby's movements  · You have abdominal cramps, pressure, or tightening  · You have a change in vaginal discharge  · You have chills or a fever  · You have vaginal itching, burning, or pain  · You have yellow, green, white, or foul-smelling vaginal discharge  · You have pain or burning when you urinate, less urine than usual, or pink or bloody urine  · You have questions or concerns about your condition or care  How to care for yourself at this stage of your pregnancy:   · Eat a variety of healthy foods  Healthy foods include fruits, vegetables, whole-grain breads, low-fat dairy foods, beans, lean meats, and fish  Drink liquids as directed  Ask how much liquid to drink each day and which liquids are best for you  Limit caffeine to less than 200 milligrams each day  Limit your intake of fish to 2 servings each week  Choose fish low in mercury such as canned light tuna, shrimp, salmon, cod, or tilapia  Do not  eat fish high in mercury such as swordfish, tilefish, cassy mackerel, and shark  · Take prenatal vitamins as directed    Your need for certain vitamins and minerals, such as folic acid, increases during pregnancy  Prenatal vitamins provide some of the extra vitamins and minerals you need  Prenatal vitamins may also help to decrease the risk of certain birth defects  · Rest as needed  Put your feet up if you have swelling in your ankles and feet  · Do not smoke  Smoking increases your risk of a miscarriage and other health problems during your pregnancy  Smoking can cause your baby to be born early or weigh less at birth  Ask your healthcare provider for information if you need help quitting  · Do not drink alcohol  Alcohol passes from your body to your baby through the placenta  It can affect your baby's brain development and cause fetal alcohol syndrome (FAS)  FAS is a group of conditions that causes mental, behavior, and growth problems  · Talk to your healthcare provider before you take any medicines  Many medicines may harm your baby if you take them when you are pregnant  Do not take any medicines, vitamins, herbs, or supplements without first talking to your healthcare provider  Never use illegal or street drugs (such as marijuana or cocaine) while you are pregnant  · Talk to your healthcare provider before you travel  You may not be able to travel in an airplane after 36 weeks  He may also recommend that you avoid long road trips  Safety tips during pregnancy:   · Avoid hot tubs and saunas  Do not use a hot tub or sauna while you are pregnant, especially during your first trimester  Hot tubs and saunas may raise your baby's temperature and increase the risk of birth defects  · Avoid toxoplasmosis  This is an infection caused by eating raw meat or being around infected cat feces  It can cause birth defects, miscarriages, and other problems  Wash your hands after you touch raw meat  Make sure any meat is well-cooked before you eat it  Avoid raw eggs and unpasteurized milk   Use gloves or ask someone else to clean your cat's litter box while you are pregnant  · Ask your healthcare provider about travel  The most comfortable time to travel is during the second trimester  Ask your healthcare provider if you can travel after 36 weeks  You may not be able to travel in an airplane after 36 weeks  He may also recommend that you avoid long road trips  Changes that are happening with your baby:  By 38 weeks, your baby may weigh between 6 and 9 pounds  Your baby may be about 14 inches long from the top of the head to the rump (baby's bottom)  Your baby hears well enough to know your voice  As your baby gets larger, you may feel fewer kicks and more stretching and rolling  Your baby may move into a head-down position  Your baby will also rest lower in your abdomen  What you need to know about prenatal care: Your healthcare provider will check your blood pressure and weight  You may also need the following:  · A urine test  may also be done to check for sugar and protein  These can be signs of gestational diabetes or infection  Protein in your urine may also be a sign of preeclampsia  Preeclampsia is a condition that can develop during week 20 or later of your pregnancy  It causes high blood pressure, and it can cause problems with your kidneys and other organs  · A blood test  may be done to check for anemia (low iron level)  · A Tdap vaccine  may be recommended by your healthcare provider  · A group B strep test  is a test that is done to check for group B strep infection  Group B strep is a type of bacteria that may be found in the vagina or rectum  It can be passed to your baby during delivery if you have it  Your healthcare provider will take swab your vagina or rectum and send the sample to the lab for tests  · Fundal height  is a measurement of your uterus to check your baby's growth  This number is usually the same as the number of weeks that you have been pregnant   Your healthcare provider may also check your baby's position  · Your baby's heart rate  will be checked  © Copyright [address] Information is for End User's use only and may not be sold, redistributed or otherwise used for commercial purposes  All illustrations and images included in CareNotes® are the copyrighted property of A D A M , Inc  or Dena Cuevas   The above information is an  only  It is not intended as medical advice for individual conditions or treatments  Talk to your doctor, nurse or pharmacist before following any medical regimen to see if it is safe and effective for you  Fetal Movement   WHAT YOU NEED TO KNOW:   Fetal movements are the kicks, rolls, and hiccups of your unborn baby  You may start to feel these movements when you are 20 weeks pregnant  The movements grow stronger and more frequent as your baby grows  Fetal movements show that your unborn baby is getting the oxygen and nutrients he needs before birth  Fewer fetal movements may signal a problem with your baby's health  DISCHARGE INSTRUCTIONS:   Follow up with your healthcare provider or obstetrician as directed:  Write down your questions so you remember to ask them during your visits  Normal fetal movement:  Fetal activity can be described by 4 states, from least to most active  During quiet sleep, your unborn baby may be still for up to 2 hours  During active sleep, he kicks, rolls, and moves often  During the quiet awake state, he may only move his eyes  The active awake state includes strong kicks and rolls  What affects fetal movement:  You may feel your baby move more after you eat, or after you drink caffeine  You may feel your baby move less while you are more active, such as when you exercise  You may also feel fewer movements if you are obese  Certain medicines can change your baby's movements  Tell your healthcare provider about the medicines you are taking     Track fetal movements at home:  Fetal movement is most often felt when you lie quietly on your side  Your healthcare provider may ask you to count movements for 2 hours  He may ask you to track how long it takes for your baby to move 10 times  Keep a log of your baby's movements  Contact your healthcare provider or obstetrician if:   · It takes longer than usual to feel 10 of your unborn baby's movements  · You do not feel your unborn baby move at least 10 times in 2 hours  · The skin on your hands, feet, and around your eyes is more swollen than usual      · You have a headache for at least 24 hours  · Tiny red dots appear on your skin  · Your belly is tender when you press on it  · You have questions or concerns about your condition or care  Return to the emergency department if:   · You do not feel your unborn baby move for 12 hours  · You feel cramping or constant pain in your abdomen  · You have heavy bleeding from your vagina  · You have a severe headache and cannot see clearly  · You are having trouble breathing or are vomiting  · You have a seizure  © Copyright [address] Information is for End User's use only and may not be sold, redistributed or otherwise used for commercial purposes  All illustrations and images included in CareNotes® are the copyrighted property of A D A M , Inc  or [facility] Mukesh Cuevas   The above information is an  only  It is not intended as medical advice for individual conditions or treatments  Talk to your doctor, nurse or pharmacist before following any medical regimen to see if it is safe and effective for you

## 2021-06-24 LAB — GP B STREP DNA SPEC QL NAA+PROBE: NEGATIVE

## 2021-06-28 ENCOUNTER — PATIENT OUTREACH (OUTPATIENT)
Dept: OBGYN CLINIC | Facility: CLINIC | Age: 26
End: 2021-06-28

## 2021-06-28 NOTE — PROGRESS NOTES
Tania Muniz presents today for routine OB visit at 36w6d  Blood Pressure: 131/70  [address] [employer]  used 110812  Wt=86 2 kg (190 lb); Body mass index is 30 67 kg/m² ; TWG=10 2 kg (22 lb 7 2 oz)  Fetal Heart Rate: 143; Fundal Height (cm): 38 cm  Abdomen: gravid, soft, non-tender  She denies any concerns  Denies uterine contractions  Denies vaginal bleeding or leaking of fluid  Reports adequate fetal movement of at least 10 movements in 2 hours once daily  US done 6/7/2021, VTX, EFW 84 %, fetal abd at 86%, baby measured 2 wks ahead   Scheduled for ultrasound 4 wks from last, scheduled 7/8/2021  Her 1 hr GTT was 140, but she passed  her 3 hr GTT  Her hgb was 11 8  GBS negative- reviewed  Perineal massage- information reviewed to decrease tearing during delivery  Contraception- Mirena iUD through [address] [facility]  Reviewed  labor precautions and fetal kick counts  Advised to continue medications and return in 1 weeks  Has received 2 Covid -19 vaccines  with no side effects         Current Outpatient Medications   Medication Instructions    Prenatal MV-Min-Fe Fum-FA-DHA (PRENATAL 1 PO) 1 tablet, Oral, Daily         Pregnancy Problems (from 02/10/21 to present)     Problem Noted Resolved    Prenatal care, third trimester 6/8/2021 by Charles White MD No    Elevated glucose tolerance test 6/8/2021 by Charles White MD No    34 weeks gestation of pregnancy 2/11/2021 by Chelsi Dewey DO No    Overview Signed 2/11/2021  8:51 AM by Chelsi Dewey DO     Late to care at 17 weeks

## 2021-06-29 ENCOUNTER — ROUTINE PRENATAL (OUTPATIENT)
Dept: OBGYN CLINIC | Facility: CLINIC | Age: 26
End: 2021-06-29

## 2021-06-29 VITALS
HEART RATE: 84 BPM | DIASTOLIC BLOOD PRESSURE: 70 MMHG | WEIGHT: 190 LBS | SYSTOLIC BLOOD PRESSURE: 131 MMHG | BODY MASS INDEX: 30.53 KG/M2 | HEIGHT: 66 IN

## 2021-06-29 DIAGNOSIS — Z3A.37 37 WEEKS GESTATION OF PREGNANCY: ICD-10-CM

## 2021-06-29 DIAGNOSIS — Z34.93 PRENATAL CARE IN THIRD TRIMESTER: Primary | ICD-10-CM

## 2021-06-29 DIAGNOSIS — Z78.9 LANGUAGE BARRIER: ICD-10-CM

## 2021-06-29 PROCEDURE — 99213 OFFICE O/P EST LOW 20 MIN: CPT | Performed by: NURSE PRACTITIONER

## 2021-06-29 NOTE — PATIENT INSTRUCTIONS
El embarazo de la semana 35 a la 38   CUIDADO AMBULATORIO:   Qué cambios están ocurriendo en luna cuerpo: Al principio de las 37 semanas se considera que usted está en término completo  Podría ser mas fácil que usted respire si luna bebé se ha posicionado con la isabel hacia abajo  Es posible que usted necesite orinar con mayor frecuencia ya que el bebé podría estar presionando luna vejiga  Usted también podría sentir más molestias y cansarse fácilmente  Busque atención médica de inmediato si:  · Usted presenta un lamont dolor de isabel que no desaparece  · Usted tiene cambios en la visión nuevos o en aumento, sonia visión borrosa o con manchas  · Usted tiene inflamación nueva o creciente en luna kathryn o herrera  · Usted tiene manchado o sangrado vaginal     · Usted rompe raiza o siente un chorro o gotas de agua tibia que le está bajando por luna vagina  Comuníquese con luna médico si:  · Usted tiene más de 5 contracciones en 1 hora  · Usted nota algún cambio en los movimientos de luna bebé  · Usted tiene calambres, presión o tensión abdominal     · Usted tiene un cambio en la secreción vaginal     · Usted tiene escalofríos o fiebre  · Usted tiene comezón, ardor o dolor vaginal     · Usted tiene marco secreción vaginal amarillenta, verdosa, julian o de [employer]  · Usted tiene dolor o ardor al Nader Nasuti, orina menos de lo habitual o tiene [place] rosada o sanguinolenta  · Usted tiene preguntas o inquietudes acerca de luna condición o cuidado  Cómo cuidarse en esta etapa de luna embarazo:  · Consuma alimentos saludables y variados  Alimentos saludables incluyen frutas, verduras, panes de kristina integral, alimentos lácteos bajos en grasa, frijoles, chantal magras y pescado  [place] líquidos sonia se le haya indicado  Pregunte cuánto líquido debe britt cada día y cuáles líquidos son los más adecuados para usted  Limite el consumo de cafeína a menos de [address]   Limite el consumo de pescado a 2 porciones cada semana  Escoja pescado con concentraciones bajas de maryuri sonia atún al natural enlatado, camarón, salmón, bacalao o tilapia  No coma pescado con concentraciones altas de maryuri sonia pez haim, caballa gigante, pargo rayado y tiburón  · [address] [place]  Luna necesidad de ciertas vitaminas y [address], sonia el ácido fólico, aumenta lisandro el [facility]  Las vitaminas prenatales proporcionan algunas de las vitaminas y minerales adicionales que usted necesita  Las vitaminas prenatales también podrían ayudar a disminuir el riesgo de ciertos defectos de nacimiento  · Descanse tanto sonia sea necesario  Levante torsten pies si tiene inflamación en torsten tobillos y pies  · No fume  Fumar aumenta el riesgo de aborto espontáneo y otros problemas de thony lisandro luna [facility]  Fumar puede causar que luna bebé nazca antes de tiempo o que pese menos al nacer  Solicite información a luna médico si usted necesita ayuda para dejar de fumar  · No consuma alcohol  El alcohol pasa de luna cuerpo al bebé a través de la placenta  Puede afectar el desarrollo del cerebro de luna bebé y provocar el síndrome de alcoholismo fetal (SAF)  El SAF es un jessica de condiciones que causan problemas [facility], de comportamiento y de crecimiento  · Consulte con luna médico antes de britt cualquier medicamento  Muchos medicamentos pueden perjudicar a luna bebé si usted los cherie [address] [place] [address]  No tome ningún medicamento, vitaminas, hierbas o suplementos sin audi consultar con luna Larence Derek  use drogas ilegales o de la escoto (sonia marihuana o cocaína) mientras está embarazada  · Hable con luna médico antes de viajar  Es posible que no pueda viajar en avión después de las [address]  También le puede recomendar que evite largos viajes por carretera  Consejos de seguridad lisandro el embarazo:  · Evite jacuzzis y saunas   No use un jacuzzi o un sauna mientras usted está [place], especialmente lisandro el primer trimestre  [facility] y los saunas aumentan la temperatura de luna bebé y el riesgo de defectos de nacimiento  · Evite la toxoplasmosis  Kramer es marco infección causada por comer carne cruda o estar cerca del excremento de un radha infectado  [place] puede causar malformaciones congénitas, aborto espontáneo y Avila Schein  Lávese las herrera después de tocar carne cruda  Asegúrese de que la carne esté josé cocida antes de comerla  Evite los huevos crudos y la María Speck  Use guantes o pida que alguien la ayude a limpiar la caja de arena del radha mientras usted [place] Daryn  · Consulte con luna médico acerca de viajar  El [address] cómodo para viajar es lisandro el jade trimestre  Pregunte a luna médico si usted puede viajar después de las 36 semanas  Es posible que no pueda viajar en avión después de las 36 [address]  También le puede recomendar que evite largos viajes por carretera  Cambios que están ocurriendo con luna bebé: Para las 38 semanas, luna bebé podría pesar entre 6 y 5 libras  Luna bebé podría medir alrededor de 14 pulgadas de hazel desde la punta de la isabel hasta la rabadilla (parte inferior del bebé)  Luna bebé escucha lo suficientemente josé sonia para reconocer luna voz  Conforme luna bebé crece más, usted podría sentir menos patadas y sentir más que luna bebé se estira y [place]  Luna bebé podría moverse en posición con la isabel hacia abajo  Luna bebé también descansará en la parte inferior de luna abdomen  Lo que necesita saber acerca del cuidado prenatal: Luna médico le revisará luna presión arterial y [place]  Es posible que también necesite lo siguiente:  · Un examen de orina también podría realizarse para revisarle el azúcar y la proteína  Estas son señales de diabetes gestacional o de infección  La proteína en luna orina también podría ser marco señal de preeclampsia  La preeclampsia es marco condición que puede desarrollarse lisandro la semana 21 o después en luna embarazo   Esta provoca presión arterial yoselin y [employer] con torsten riñones y [place]  · Los análisis de buck se pueden realizar para revisar si tiene signos de anemia (nivel bajo del joann)  · La vacuna Tdap podría ser recomendado por luna médico     · El examen del estreptococo del jessica B es un examen que se realiza para verificar si hay infección por estreptococos del jessica B  El estreptococo del jessica B es un tipo de bacteria que se puede encontrar en la vagina o el recto  Podría ser transmitida a luna bebé lisandro el parto si usted la tiene  Luna médico podría britt Jimmy Hunger de luna vagina o recto y gregory la [employer] al laboratorio para que la examinen  · La altura uterina es marco medición del útero para controlar el desarrollo de luna bebé  [employer] por lo general es igual al número de [facility] que usted tiene de embarazo  Luna médico también podría revisar la posición de luna bebé  · El ritmo cardíaco de luna bebé será revisado  © Copyright [address] Information is for End User's use only and may not be sold, redistributed or otherwise used for commercial purposes  All illustrations and images included in CareNotes® are the copyrighted property of [employer]  or [address] [place] [address] es sólo para uso en educación  Luna intención no es darle un consejo médico sobre enfermedades o tratamientos  Colsulte con luna Radha Saritha farmacéutico antes de seguir cualquier régimen médico para saber si es seguro y efectivo para usted  Movimiento fetal   LO QUE NECESITA SABER:   Los movimientos fetales son las patadas, vueltas e hipo del bebé en el Steven Maeve  Es posible que usted empiece a sentir estos movimientos aproximadamente a las [address]  A medida que luna bebé crezca, los movimientos se sienten más sharyn y son [place] frecuentes  Los movimientos del feto comprueban que luna bebé en el útero está recibiendo el oxígeno y los nutrientes necesarios antes de nacer   Francie Keara reducción de movimientos fetales podrían señalar un problema de la thony de luna bebé  INSTRUCCIONES SOBRE EL [facility]ITAL[facility]:   Programe un control con luna médico u obstetra según lo indicado: Anote torsten preguntas para que se acuerde de hacerlas lisandro torsten visitas  Movimientos normales del feto: La actividad del jeanette en el útero puede describirse en 4 estados, del menos activo al [place]  Lisandro el estado de sueño pasivo, es probable que luna marie por nacer permanezca quieto un jackelyn de 2 horas  Lisandro el estado de sueño [place], luna bebé patalea, da vueltas y se mueve con frecuencia  Lisandro el estado vigilia tranquila, luna marie podría solamente  torsten ojos  El estado despierto activo incluye patadas sharyn y vueltas  Lo que afecta el movimiento fetal: Es posible que sienta a luna bebé moverse más después de que usted haya comido o bebido cafeína  Es probable que usted sienta menos movimientos de luna bebé en el útero cuando está más [place]ro[place], sonia cuando hace ejercicio  También podría sentir menos movimientos del feto si usted es Jurline Dubin  Ciertos medicamentos pueden afectar los movimientos de luna bebé  Infórmele a luna médico los [employer] cherie  Monitoreo de los movimientos del bebé en la casa: La mayoría de los movimientos de luna bebé en el útero se notan cuando usted se acuesta silenciosamente de lado  Es probable que luna médico le pida que cuente los movimientos del feto lisandro 2 horas  Podría pedirle que registre el tiempo que luna bebé dura para realizar 10 movimientos  Mantenga un registro de los movimientos de luna bebé  Comuníquese con luna médico u obstetra si:  · Tarda más de lo usual para sentir 10 movimientos de luna bebé en el útero  · Usted no siente a luna bebé moverse en el útero por lo menos 10 veces cada 2 horas  · La piel de torsten herrera, pies y alrededor de torsten ojos se encuentra más inflamada de lo normal     · Usted tiene dolor de isabel lisandro por lo menos 24 horas      · Selma Angry rojos pequeñitos en la piel  · Luna estómago se siente sensible al aplicarle presión  · Usted tiene preguntas o inquietudes acerca de luna condición o cuidado  Regrese a la suzette de emergencias si:  · Usted no siente a luna bebé en el útero moverse por 12 horas  · Usted siente calambres o dolor araceli en el abdomen  · Usted tiene sangrado vaginal abundante  · Usted tiene dolor de [place] severo y no puede inna con claridad  · Usted tiene dificultad para respirar o está vomitando  · Usted sufre marco convulsión  © Copyright [address] Information is for End User's use only and may not be sold, redistributed or otherwise used for commercial purposes  All illustrations and images included in CareNotes® are the copyrighted property of [employer]  or [address] [place] [address] es sólo para uso en educación  Luna intención no es darle un consejo médico sobre enfermedades o tratamientos  Colsulte con luna [place] farmacéutico antes de seguir cualquier régimen médico para saber si es seguro y efectivo para usted

## 2021-07-06 ENCOUNTER — ROUTINE PRENATAL (OUTPATIENT)
Dept: OBGYN CLINIC | Facility: CLINIC | Age: 26
End: 2021-07-06

## 2021-07-06 VITALS
BODY MASS INDEX: 30.86 KG/M2 | SYSTOLIC BLOOD PRESSURE: 112 MMHG | HEART RATE: 89 BPM | HEIGHT: 66 IN | WEIGHT: 192 LBS | DIASTOLIC BLOOD PRESSURE: 72 MMHG

## 2021-07-06 DIAGNOSIS — Z34.93 PRENATAL CARE, THIRD TRIMESTER: ICD-10-CM

## 2021-07-06 DIAGNOSIS — Z3A.34 34 WEEKS GESTATION OF PREGNANCY: ICD-10-CM

## 2021-07-06 DIAGNOSIS — R73.09 ELEVATED GLUCOSE TOLERANCE TEST: ICD-10-CM

## 2021-07-06 DIAGNOSIS — Z34.93 PRENATAL CARE IN THIRD TRIMESTER: Primary | ICD-10-CM

## 2021-07-06 PROCEDURE — 99213 OFFICE O/P EST LOW 20 MIN: CPT | Performed by: OBSTETRICS & GYNECOLOGY

## 2021-07-06 NOTE — ASSESSMENT & PLAN NOTE
No obstetric complaints today  [facility]   TWG: + 11 1kg  Contraception: IUD 6 weeks   Breastfeeding: yes  Birth plan: , plans on epidural  Discussed  labor precautions and fetal kick counts    Return to clinic in 1 week  COVID Vaccine: s/p 2 shots

## 2021-07-06 NOTE — PROGRESS NOTES
# [id number]    OB/GYN prenatal visit    S: 31 y o  Miguelito Solomon 38w0d here for PN visit  She has no obstetric complaints, including pelvic pain, contractions, vaginal bleeding, loss of fluid, or decreased fetal movement       O:  Vitals:    21 1332   BP: 112/72   Pulse: 89       Gen: no acute distress, nonlabored breathing  FHT: 145bpm   SVE: 50/-3          A/P:    Problem List Items Addressed This Visit        Other    Elevated glucose tolerance test     Normal 1 hour GTT         Prenatal care in third trimester - Primary     No obstetric complaints today  [facility]   TWG: + 11 1kg  Contraception: IUD 6 weeks   Breastfeeding: yes  Birth plan: , plans on epidural  Discussed  labor precautions and fetal kick counts    Return to clinic in 1 week  COVID Vaccine: s/p 2 shots            Chriss Johnson MD  2021  2:52 PM

## 2021-07-08 ENCOUNTER — ULTRASOUND (OUTPATIENT)
Dept: PERINATAL CARE | Facility: CLINIC | Age: 26
End: 2021-07-08

## 2021-07-08 VITALS
SYSTOLIC BLOOD PRESSURE: 98 MMHG | BODY MASS INDEX: 30.89 KG/M2 | HEART RATE: 92 BPM | HEIGHT: 66 IN | WEIGHT: 192.2 LBS | DIASTOLIC BLOOD PRESSURE: 55 MMHG

## 2021-07-08 DIAGNOSIS — Z36.4 ULTRASOUND FOR ANTENATAL SCREENING FOR FETAL GROWTH RESTRICTION: Primary | ICD-10-CM

## 2021-07-08 DIAGNOSIS — Z3A.38 38 WEEKS GESTATION OF PREGNANCY: ICD-10-CM

## 2021-07-08 PROCEDURE — 76816 OB US FOLLOW-UP PER FETUS: CPT | Performed by: OBSTETRICS & GYNECOLOGY

## 2021-07-08 PROCEDURE — 99213 OFFICE O/P EST LOW 20 MIN: CPT | Performed by: OBSTETRICS & GYNECOLOGY

## 2021-07-08 NOTE — LETTER
July 8, 2021     [address] PROVIDER    Patient: Lamar Gunn   YOB: 1995   Date of Visit: 7/8/2021       Dear   Provider: Thank you for referring Tuyetma Sil to me for evaluation  Below are my notes for this consultation  If you have questions, please do not hesitate to call me  I look forward to following your patient along with you  Sincerely,        Sonia Jefferson MD        CC: No Recipients  Sonia Jefferson MD  7/8/2021  7:23 AM  Sign when Signing Visit   Please refer to the [facility] ultrasound report in Ob Procedures for additional information regarding today's visit

## 2021-07-08 NOTE — PATIENT INSTRUCTIONS
Kick Counts in Pregnancy   WHAT YOU NEED TO KNOW:   Kick counts measure how much your baby is moving in your womb  A kick from your baby can be felt as a twist, turn, swish, roll, or jab  It is common to feel your baby kicking at 26 to 28 weeks of pregnancy  You may feel your baby kick as early as 20 weeks of pregnancy  You may want to start counting at 28 weeks  DISCHARGE INSTRUCTIONS:   Contact your healthcare provider immediately if:   · You feel a change in the number of kicks or movements of your baby  · You feel fewer than 10 kicks within 2 hours  · You have questions or concerns about your baby's movements  Why measure kick counts:  Your baby's movement may provide information about your baby's health  He or she may move less, or not at all, if there are problems  Your baby may move less if he or she is not getting enough oxygen or nutrition from the placenta  Do not smoke while you are pregnant  Smoking decreases the amount of oxygen that gets to your baby  Talk to your healthcare provider if you need help to quit smoking  Tell your healthcare provider as soon as you feel a change in your baby's movements  When to measure kick counts:   · Measure kick counts at the same time every day  · Measure kick counts when your baby is awake and most active  Your baby may be most active in the evening  How to measure kick counts:  Check that your baby is awake before you measure kick counts  You can wake up your baby by lightly pushing on your belly, walking, or drinking something cold  Your healthcare provider may tell you different ways to measure kick counts  You may be told to do the following:  · Use a chart or clock to keep track of the time you start and finish counting  · Sit in a chair or lie on your left side  · Place your hands on the largest part of your belly  · Count until you reach 10 kicks  Write down how much time it takes to count 10 kicks       · It may take 30 minutes to 2 hours to count 10 kicks  It should not take more than 2 hours to count 10 kicks  Follow up with your healthcare provider as directed:  Write down your questions so you remember to ask them during your visits  © Copyright [address] Information is for End User's use only and may not be sold, redistributed or otherwise used for commercial purposes  All illustrations and images included in CareNotes® are the copyrighted property of A D A M , Inc  or [facility] Mukesh Cuevas   The above information is an  only  It is not intended as medical advice for individual conditions or treatments  Talk to your doctor, nurse or pharmacist before following any medical regimen to see if it is safe and effective for you

## 2021-07-12 ENCOUNTER — HOSPITAL ENCOUNTER (OUTPATIENT)
Facility: HOSPITAL | Age: 26
Discharge: HOME | End: 2021-07-13
Attending: OBSTETRICS & GYNECOLOGY | Admitting: OBSTETRICS & GYNECOLOGY

## 2021-07-12 NOTE — PROGRESS NOTES
Vaishali Marti presents today for routine OB visit at 38w6d  Blood Pressure: 117/73 [employer]  used [id number]  patient was seen in triage early this morning to r/o labor,  her cervix was 2 cm and was unchanged so she was sent home,  She was to return for NST in triage for a difficult to interpret area of FHR tracing,  possible late deceleration with  Follow-up cat 1 FHR  Tracing  Pt States she is to go to triage today at 3 pm     her BPP was 10/10 in triage  Wt=88 3 kg (194 lb 9 6 oz); Body mass index is 38 01 kg/m² ; TWG=12 3 kg (27 lb 0 8 oz)  Fetal Heart Rate: 151; Fundal Height (cm): 39 cm  Abdomen: gravid, soft, non-tender  She reports patient her contractions are still every 10 minutes  Reports contractions starting  on 07/12/2021  Denies vaginal bleeding or leaking of fluid  She reports decreased fetal movement since she left triage  Recommend for patient to go to triage, her sister is here to take her  Last US 7/8/2021, vtx, EFW at 86% 8 lbs 5 oz, TERENCE 18 4cm  Scheduled for ultrasound as indicated  Her 1 hr GTT was 140, but 3 hr GTT was normal  GBS negative  Contraception- Mirena through [address] [place] [address] [address] [place] [address]  Has had Covid 19 vaccine x2    Patient to triage accompanied with her sister    Triage notified      Current Outpatient Medications   Medication Instructions    Prenatal MV-Min-Fe Fum-FA-DHA (PRENATAL 1 PO) 1 tablet, Oral, Daily         Pregnancy Problems (from 02/10/21 to present)     Problem Noted Resolved    Elevated glucose tolerance test 6/8/2021 by Maria Elena Echevarria MD No    Prenatal care, third trimester 6/8/2021 by Maria Elena Echevarria MD 7/6/2021 by Sia Arzola MD    34 weeks gestation of pregnancy 2/11/2021 by Fred Mcallister DO 7/6/2021 by Sia Arzola MD    Overview Signed 2/11/2021  8:51 AM by Fred Mcallister DO     Late to care at 17 weeks

## 2021-07-13 ENCOUNTER — ANESTHESIA EVENT (INPATIENT)
Dept: ANESTHESIOLOGY | Facility: HOSPITAL | Age: 26
DRG: 560 | End: 2021-07-13
Payer: COMMERCIAL

## 2021-07-13 ENCOUNTER — ANESTHESIA (INPATIENT)
Dept: ANESTHESIOLOGY | Facility: HOSPITAL | Age: 26
DRG: 560 | End: 2021-07-13
Payer: COMMERCIAL

## 2021-07-13 ENCOUNTER — ROUTINE PRENATAL (OUTPATIENT)
Dept: OBGYN CLINIC | Facility: CLINIC | Age: 26
End: 2021-07-13

## 2021-07-13 ENCOUNTER — HOSPITAL ENCOUNTER (INPATIENT)
Facility: HOSPITAL | Age: 26
LOS: 2 days | Discharge: HOME | DRG: 560 | End: 2021-07-15
Attending: OBSTETRICS & GYNECOLOGY | Admitting: OBSTETRICS & GYNECOLOGY
Payer: COMMERCIAL

## 2021-07-13 VITALS
RESPIRATION RATE: 18 BRPM | TEMPERATURE: 98 F | HEART RATE: 75 BPM | DIASTOLIC BLOOD PRESSURE: 61 MMHG | OXYGEN SATURATION: 97 % | SYSTOLIC BLOOD PRESSURE: 113 MMHG

## 2021-07-13 VITALS
HEART RATE: 79 BPM | SYSTOLIC BLOOD PRESSURE: 117 MMHG | HEIGHT: 60 IN | WEIGHT: 194.6 LBS | DIASTOLIC BLOOD PRESSURE: 73 MMHG | BODY MASS INDEX: 38.21 KG/M2

## 2021-07-13 DIAGNOSIS — Z34.93 PRENATAL CARE IN THIRD TRIMESTER: Primary | ICD-10-CM

## 2021-07-13 DIAGNOSIS — Z3A.39 39 WEEKS GESTATION OF PREGNANCY: ICD-10-CM

## 2021-07-13 DIAGNOSIS — Z78.9 LANGUAGE BARRIER: ICD-10-CM

## 2021-07-13 LAB
ABO GROUP BLD: NORMAL
AMPHETAMINES SERPL QL SCN: NEGATIVE
BARBITURATES UR QL: NEGATIVE
BENZODIAZ UR QL: NEGATIVE
BLD GP AB SCN SERPL QL: NEGATIVE
COCAINE UR QL: NEGATIVE
ERYTHROCYTE [DISTWIDTH] IN BLOOD BY AUTOMATED COUNT: 12.9 % (ref 11.6–15.1)
HCT VFR BLD AUTO: 37.1 % (ref 34.8–46.1)
HGB BLD-MCNC: 12.4 G/DL (ref 11.5–15.4)
MCH RBC QN AUTO: 31.4 PG (ref 26.8–34.3)
MCHC RBC AUTO-ENTMCNC: 33.4 G/DL (ref 31.4–37.4)
MCV RBC AUTO: 94 FL (ref 82–98)
METHADONE UR QL: NEGATIVE
OPIATES UR QL SCN: NEGATIVE
OXYCODONE+OXYMORPHONE UR QL SCN: NEGATIVE
PCP UR QL: NEGATIVE
PLATELET # BLD AUTO: 243 THOUSANDS/UL (ref 149–390)
PMV BLD AUTO: 10.7 FL (ref 8.9–12.7)
RBC # BLD AUTO: 3.95 MILLION/UL (ref 3.81–5.12)
RH BLD: POSITIVE
SL AMB  POCT GLUCOSE, UA: NEGATIVE
SL AMB LEUKOCYTE ESTERASE,UA: NEGATIVE
SL AMB POCT BILIRUBIN,UA: NEGATIVE
SL AMB POCT BLOOD,UA: NEGATIVE
SL AMB POCT KETONES,UA: NEGATIVE
SL AMB POCT NITRITE,UA: NEGATIVE
SL AMB POCT PH,UA: NEGATIVE
SL AMB POCT SPECIFIC GRAVITY,UA: 1.01
SL AMB POCT URINE PROTEIN: NEGATIVE
SPECIMEN EXPIRATION DATE: NORMAL
THC UR QL: NEGATIVE
WBC # BLD AUTO: 11.38 THOUSAND/UL (ref 4.31–10.16)

## 2021-07-13 PROCEDURE — 81002 URINALYSIS NONAUTO W/O SCOPE: CPT | Performed by: NURSE PRACTITIONER

## 2021-07-13 PROCEDURE — 4A1HXCZ MONITORING OF PRODUCTS OF CONCEPTION, CARDIAC RATE, EXTERNAL APPROACH: ICD-10-PCS | Performed by: OBSTETRICS & GYNECOLOGY

## 2021-07-13 PROCEDURE — 3E033VJ INTRODUCTION OF OTHER HORMONE INTO PERIPHERAL VEIN, PERCUTANEOUS APPROACH: ICD-10-PCS | Performed by: OBSTETRICS & GYNECOLOGY

## 2021-07-13 PROCEDURE — 99215 OFFICE O/P EST HI 40 MIN: CPT

## 2021-07-13 PROCEDURE — 99213 OFFICE O/P EST LOW 20 MIN: CPT | Performed by: NURSE PRACTITIONER

## 2021-07-13 PROCEDURE — 99214 OFFICE O/P EST MOD 30 MIN: CPT

## 2021-07-13 PROCEDURE — NC001 PR NO CHARGE: Performed by: OBSTETRICS & GYNECOLOGY

## 2021-07-13 PROCEDURE — 85027 COMPLETE CBC AUTOMATED: CPT

## 2021-07-13 PROCEDURE — 86901 BLOOD TYPING SEROLOGIC RH(D): CPT

## 2021-07-13 PROCEDURE — 80307 DRUG TEST PRSMV CHEM ANLYZR: CPT | Performed by: OBSTETRICS & GYNECOLOGY

## 2021-07-13 PROCEDURE — 86900 BLOOD TYPING SEROLOGIC ABO: CPT

## 2021-07-13 PROCEDURE — 86592 SYPHILIS TEST NON-TREP QUAL: CPT

## 2021-07-13 PROCEDURE — 86850 RBC ANTIBODY SCREEN: CPT

## 2021-07-13 RX ORDER — LIDOCAINE HYDROCHLORIDE AND EPINEPHRINE 15; 5 MG/ML; UG/ML
INJECTION, SOLUTION EPIDURAL
Status: COMPLETED | OUTPATIENT
Start: 2021-07-13 | End: 2021-07-13

## 2021-07-13 RX ORDER — OXYTOCIN/RINGER'S LACTATE 30/500 ML
1-30 PLASTIC BAG, INJECTION (ML) INTRAVENOUS
Status: DISCONTINUED | OUTPATIENT
Start: 2021-07-13 | End: 2021-07-14

## 2021-07-13 RX ORDER — SODIUM CHLORIDE, SODIUM LACTATE, POTASSIUM CHLORIDE, CALCIUM CHLORIDE 600; 310; 30; 20 MG/100ML; MG/100ML; MG/100ML; MG/100ML
125 INJECTION, SOLUTION INTRAVENOUS CONTINUOUS
Status: DISCONTINUED | OUTPATIENT
Start: 2021-07-13 | End: 2021-07-14

## 2021-07-13 RX ORDER — ONDANSETRON 2 MG/ML
4 INJECTION INTRAMUSCULAR; INTRAVENOUS EVERY 6 HOURS PRN
Status: DISCONTINUED | OUTPATIENT
Start: 2021-07-13 | End: 2021-07-14

## 2021-07-13 RX ADMIN — LIDOCAINE HYDROCHLORIDE AND EPINEPHRINE 3 ML: 15; 5 INJECTION, SOLUTION EPIDURAL at 21:14

## 2021-07-13 RX ADMIN — ROPIVACAINE HYDROCHLORIDE: 2 INJECTION, SOLUTION EPIDURAL; INFILTRATION at 21:21

## 2021-07-13 RX ADMIN — SODIUM CHLORIDE, SODIUM LACTATE, POTASSIUM CHLORIDE, AND CALCIUM CHLORIDE 999.9 ML/HR: .6; .31; .03; .02 INJECTION, SOLUTION INTRAVENOUS at 20:05

## 2021-07-13 RX ADMIN — Medication 2 MILLI-UNITS/MIN: at 20:43

## 2021-07-13 RX ADMIN — SODIUM CHLORIDE, SODIUM LACTATE, POTASSIUM CHLORIDE, AND CALCIUM CHLORIDE 999 ML/HR: .6; .31; .03; .02 INJECTION, SOLUTION INTRAVENOUS at 23:35

## 2021-07-13 RX ADMIN — LIDOCAINE HYDROCHLORIDE AND EPINEPHRINE 2 ML: 15; 5 INJECTION, SOLUTION EPIDURAL at 21:17

## 2021-07-13 RX ADMIN — SODIUM CHLORIDE, SODIUM LACTATE, POTASSIUM CHLORIDE, AND CALCIUM CHLORIDE 125 ML/HR: .6; .31; .03; .02 INJECTION, SOLUTION INTRAVENOUS at 20:57

## 2021-07-13 NOTE — DISCHARGE INSTRUCTIONS
Pregnancy at 44 to 40 Weeks   AMBULATORY CARE:   What changes are happening to your body: You are now getting close to your due date  Your due date is just an estimate of when your baby will be born  Your baby may be born before or after your due date  Your breathing may be easier if your baby has moved down into a head-down position  You may need to urinate more often because the baby may be pressing on your bladder  You may also feel more discomfort and tire easily  You may also be having trouble sleeping  Seek care immediately if:   · You develop a severe headache that does not go away  · You have new or increased vision changes, such as blurred or spotted vision  · You have new or increased swelling in your face or hands  · You have vaginal spotting or bleeding  · Your water broke or you feel warm water gushing or trickling from your vagina  Contact your healthcare provider if:   · You have more than 5 contractions in 1 hour  · You notice any changes in your baby's movements  · You have abdominal cramps, pressure, or tightening  · You have a change in vaginal discharge  · You have chills or a fever  · You have vaginal itching, burning, or pain  · You have yellow, green, white, or foul-smelling vaginal discharge  · You have pain or burning when you urinate, less urine than usual, or pink or bloody urine  · You have questions or concerns about your condition or care  How to care for yourself at this stage of your pregnancy:   · Eat a variety of healthy foods  Healthy foods include fruits, vegetables, whole-grain breads, low-fat dairy foods, beans, lean meats, and fish  Drink liquids as directed  Ask how much liquid to drink each day and which liquids are best for you  Limit caffeine to less than 200 milligrams each day  Limit your intake of fish to 2 servings each week  Choose fish low in mercury such as canned light tuna, shrimp, salmon, cod, or tilapia   Do not  eat fish high in mercury such as swordfish, tilefish, cassy mackerel, and shark  · Take prenatal vitamins as directed  Your need for certain vitamins and minerals, such as folic acid, increases during pregnancy  Prenatal vitamins provide some of the extra vitamins and minerals you need  Prenatal vitamins may also help to decrease the risk of certain birth defects  · Rest as needed  Put your feet up if you have swelling in your ankles and feet  · Do not smoke  Smoking increases your risk of a miscarriage and other health problems during your pregnancy  Smoking can cause your baby to be born early or weigh less at birth  Ask your healthcare provider for information if you need help quitting  · Do not drink alcohol  Alcohol passes from your body to your baby through the placenta  It can affect your baby's brain development and cause fetal alcohol syndrome (FAS)  FAS is a group of conditions that causes mental, behavior, and growth problems  · Talk to your healthcare provider before you take any medicines  Many medicines may harm your baby if you take them when you are pregnant  Do not take any medicines, vitamins, herbs, or supplements without first talking to your healthcare provider  Never use illegal or street drugs (such as marijuana or cocaine) while you are pregnant  · Talk to your healthcare provider before you travel  You may not be able to travel in an airplane after 36 weeks  He may also recommend that you avoid long road trips  Safety tips during pregnancy:   · Avoid hot tubs and saunas  Do not use a hot tub or sauna while you are pregnant, especially during your first trimester  Hot tubs and saunas may raise your baby's temperature and increase the risk of birth defects  · Avoid toxoplasmosis  This is an infection caused by eating raw meat or being around infected cat feces  It can cause birth defects, miscarriages, and other problems   Wash your hands after you touch raw meat  Make sure any meat is well-cooked before you eat it  Avoid raw eggs and unpasteurized milk  Use gloves or ask someone else to clean your cat's litter box while you are pregnant  · Ask your healthcare provider about travel  The most comfortable time to travel is during the second trimester  Ask your healthcare provider if you can travel after 36 weeks  You may not be able to travel in an airplane after 36 weeks  He may also recommend that you avoid long road trips  Changes that are happening with your baby: Your baby is ready to be born  At birth, your baby may weigh about 6 to 9 pounds and be about 19 to 21 inches long  Your baby may be in a head-down position  Your baby will also rest lower in your abdomen  What you need to know about prenatal care: Your healthcare provider will check your blood pressure and weight  You may also need the following:  · A urine test  may also be done to check for sugar and protein  These can be signs of gestational diabetes or infection  Protein in your urine may also be a sign of preeclampsia  Preeclampsia is a condition that can develop during week 20 or later of your pregnancy  It causes high blood pressure, and it can cause problems with your kidneys and other organs  · Your baby's heart rate  will be checked  © Copyright [address] Information is for End User's use only and may not be sold, redistributed or otherwise used for commercial purposes  All illustrations and images included in CareNotes® are the copyrighted property of [employer] Inc  or [facility] Mukesh Cuevas   The above information is an  only  It is not intended as medical advice for individual conditions or treatments  Talk to your doctor, nurse or pharmacist before following any medical regimen to see if it is safe and effective for you

## 2021-07-13 NOTE — PATIENT INSTRUCTIONS
El embarazo de la semana 44 a la 40   CUIDADO AMBULATORIO:   Qué cambios están ocurriendo en luna cuerpo: Usted ahora está acercándose a luna fecha de parto  Luna fecha de parto es sólo marco estimación de cuándo nacerá luna bebé  Luna bebé podría nacer antes o después de luna fecha de parto  Podría ser mas fácil que usted respire si luna bebé se ha posicionado con la isabel hacia abajo  Es posible que usted necesite orinar con mayor frecuencia ya que el bebé podría estar presionando luna vejiga  Usted también podría sentir más molestias y cansarse fácilmente  También podría tener dificultad para dormir  Busque atención médica de inmediato si:  · Usted presenta un lamont dolor de isabel que no desaparece  · Usted tiene cambios en la visión nuevos o en aumento, sonia visión borrosa o con manchas  · Usted tiene inflamación nueva o creciente en luna kathryn o herrera  · Usted tiene manchado o sangrado vaginal     · Usted rompe raiza o siente un chorro o gotas de agua tibia que le está bajando por luna vagina  Comuníquese con luna médico si:  · Usted tiene más de 5 contracciones en 1 hora  · Usted nota algún cambio en los movimientos de luna bebé  · Usted tiene calambres, presión o tensión abdominal     · Usted tiene un cambio en la secreción vaginal     · Usted tiene escalofríos o fiebre  · Usted tiene comezón, ardor o dolor vaginal     · Usted tiene marco secreción vaginal amarillenta, verdosa, julian o de [employer]  · Usted tiene dolor o ardor al Emaline Bumps, orina menos de lo habitual o tiene [place] rosada o sanguinolenta  · Usted tiene preguntas o inquietudes acerca de luna condición o cuidado  Cómo cuidarse en esta etapa de luna embarazo:  · Consuma alimentos saludables y variados  Alimentos saludables incluyen frutas, verduras, panes de kristina integral, alimentos lácteos bajos en grasa, frijoles, chantal magras y pescado  [place] líquidos sonia se le haya indicado   Pregunte cuánto líquido debe britt cada día y cuáles líquidos son los más adecuados para usted  Limite el consumo de cafeína a menos de [place]  Limite el consumo de pescado a 2 porciones cada semana  Escoja pescado con concentraciones bajas de maryuri sonia atún al natural enlatado, camarón, salmón, bacalao o tilapia  No coma pescado con concentraciones altas de maryuri sonia pez haim, caballa gigante, pargo rayado y tiburón  · [id number]  Luna necesidad de ciertas vitaminas y [facility], sonia el ácido fólico, aumenta lisandro el [facility]  Las vitaminas prenatales proporcionan algunas de las vitaminas y minerales adicionales que usted necesita  Las vitaminas prenatales también podrían ayudar a disminuir el riesgo de ciertos defectos de nacimiento  · Descanse tanto sonia sea necesario  Levante torsten pies si tiene inflamación en torsten tobillos y pies  · No fume  Fumar aumenta el riesgo de aborto espontáneo y otros problemas de thony lisandro luna [facility]  Fumar puede causar que luna bebé nazca antes de tiempo o que pese menos al nacer  Solicite información a luna médico si usted necesita ayuda para dejar de fumar  · No consuma alcohol  El alcohol pasa de luna cuerpo al bebé a través de la placenta  Puede afectar el desarrollo del cerebro de luna bebé y provocar el síndrome de alcoholismo fetal (SAF)  El SAF es un jessica de condiciones que causan problemas [place], de comportamiento y de crecimiento  · Consulte con luna médico antes de britt cualquier medicamento  Muchos medicamentos pueden perjudicar a luna bebé si usted los cherie [address] [place] [address]  No tome ningún medicamento, vitaminas, hierbas o suplementos sin audi consultar con luna Farnaz Christian  use drogas ilegales o de la escoto (sonia marihuana o cocaína) mientras está embarazada  · Hable con luna médico antes de viajar  Es posible que no pueda viajar en avión después de las 36 [facility]   También le puede recomendar que evite largos viajes por carretera  Consejos de seguridad lisandro el embarazo:  · Evite jacuzzis y saunas  No use un jacuzzi o un sauna mientras usted está embarazada, especialmente lisandro el primer trimestre  Los [facility] y los saunas aumentan la temperatura de garnett bebé y el riesgo de defectos de nacimiento  · Evite la toxoplasmosis  [place] es marco infección causada por comer carne cruda o estar cerca del excremento de un radha infectado  [place] puede causar malformaciones congénitas, aborto espontáneo y Avila Schein  Lávese las herrera después de tocar carne cruda  Asegúrese de que la carne esté josé cocida antes de comerla  Evite los huevos crudos y la Layman Mock  Use guantes o pida que alguien la ayude a limpiar la caja de arena del radha mientras usted Renate Dakin  · Consulte con garnett médico acerca de viajar  El [address] cómodo para viajar es lisandro el jade trimestre  Pregunte a garnett médico si usted puede viajar después de las 36 semanas  Es posible que no pueda viajar en avión después de las 36 [facility]  También le puede recomendar que evite largos viajes por carretera  Cambios que están ocurriendo con garnett bebé: Garnett bebé está listo para nacer  Al momento de nacer, garnett bebé podría pesar alrededor de 6 a 9 libras y medir alrededor de 23 a 21 pulgadas de hazel  Garnett bebé podría estar en posición con la isabel hacia abajo  Garnett bebé también descansará en la parte inferior de garnett abdomen  Lo que necesita saber acerca del cuidado prenatal: Garnett médico le revisará garnett presión arterial y [place]  Es posible que también necesite lo siguiente:  · Un examen de orina también podría realizarse para revisarle el azúcar y la proteína  Estas son señales de diabetes gestacional o de infección  La proteína en garnett orina también podría ser marco señal de preeclampsia  La preeclampsia es marco condición que puede desarrollarse lisandro la semana 21 o después en garnett embarazo   Esta provoca presión arterial yoselin y puede provocar problemas con torsten riñones y otros órganos  · El ritmo cardíaco de luna bebé será revisado  © Copyright [address] Information is for End User's use only and may not be sold, redistributed or otherwise used for commercial purposes  All illustrations and images included in CareNotes® are the copyrighted property of A D A M , Inc  or [facility] es sólo para uso en educación  Luna intención no es darle un consejo médico sobre enfermedades o tratamientos  Colsulte con luna Ozell Fabry farmacéutico antes de seguir cualquier régimen médico para saber si es seguro y efectivo para usted  Movimiento fetal   LO QUE NECESITA SABER:   Los movimientos fetales son las patadas, vueltas e hipo del bebé en el [place]  Es posible que usted empiece a sentir estos movimientos aproximadamente a las [address]  A medida que luna bebé crezca, los movimientos se sienten más sharyn y son [place] frecuentes  Los movimientos del feto comprueban que luna bebé en el útero está recibiendo el oxígeno y los nutrientes necesarios antes de nacer  La reducción de movimientos fetales podrían señalar un problema de la thony de luna bebé  INSTRUCCIONES SOBRE EL [facility]ITAL[facility]IA:   Programe un control con luna médico u obstetra según lo indicado: Anote torsten preguntas para que se acuerde de hacerlas lisandro torsten visitas  Movimientos normales del feto: La actividad del jeanette en el útero puede describirse en 4 estados, del menos activo al [place]  Lisandro el estado de sueño pasivo, es probable que luan marie por nacer permanezca quieto un jackelyn de 2 horas  Lisandro el estado de sueño [place], luna bebé patalea, da vueltas y se mueve con frecuencia  Lisandro el estado vigilia tranquila, luna marie podría solamente  torsten ojos  El estado despierto activo incluye patadas sharyn y vueltas  Lo que afecta el movimiento fetal: Es posible que sienta a luna bebé moverse más después de que usted haya comido o bebido cafeína   Es probable que usted sienta menos movimientos de luna bebé en el útero cuando está más [place], sonia cuando hace ejercicio  También podría sentir menos movimientos del feto si usted es Tiffany Marshal  Ciertos medicamentos pueden afectar los movimientos de luna bebé  Infórmele a luna médico los [employer] cherie  Monitoreo de los movimientos del bebé en la casa: La mayoría de los movimientos de luna bebé en el útero se notan cuando usted se acuesta silenciosamente de lado  Es probable que luna médico le pida que cuente los movimientos del feto lisandro 2 horas  Podría pedirle que registre el tiempo que luna bebé dura para realizar 10 movimientos  Mantenga un registro de los movimientos de luna bebé  Comuníquese con luna médico u obstetra si:  · Tarda más de lo usual para sentir 10 movimientos de luna bebé en el útero  · Usted no siente a luna bebé moverse en el útero por lo menos 10 veces cada 2 horas  · La piel de torsten herrera, pies y alrededor de torsten ojos se encuentra más inflamada de lo normal     · Usted tiene dolor de isabel lisandro por lo menos 24 horas  · Le aparecen puntos rojos pequeñitos en la piel  · Luna estómago se siente sensible al aplicarle presión  · Usted tiene preguntas o inquietudes acerca de luna condición o cuidado  Regrese a la suzette de emergencias si:  · Usted no siente a luna bebé en el útero moverse por 12 horas  · Usted siente calambres o dolor araceli en el abdomen  · Usted tiene sangrado vaginal abundante  · Usted tiene dolor de [place] severo y no puede inna con claridad  · Usted tiene dificultad para respirar o está vomitando  · Usted sufre marco convulsión  © Copyright [address] Information is for End User's use only and may not be sold, redistributed or otherwise used for commercial purposes  All illustrations and images included in CareNotes® are the copyrighted property of A D A SUPRIYA , Inc  or [address] [place] [address] es sólo para uso en educación   Luna intención no es darle un consejo médico sobre enfermedades o tratamientos  Colsulte con luna Marleni Reyes farmacéutico antes de seguir cualquier régimen médico para saber si es seguro y efectivo para usted

## 2021-07-13 NOTE — H&P
[address] [facility] 31 y o  female MRN: [id number]  Unit/Bed#: LD TRIAGE  Encounter: [id number]      Assessment:  31 y o   at 39w0d admitted for labor augmentation  EFW: 8lbs 5oz (86%) on 21  VTX by transabdominal ultrasound on   SVE: Cervical Dilation: 4  Cervical Effacement: 50  Cervical Consistency: Soft  Fetal Station: -2  Presentation: Vertex  Position: Unknown  Method: Manual  OB Examiner: Leonor/Chino      Plan:   Admit  Follow up CBC, RPR, Blood Type  Induction method pitocin  GBS status: negative  Analgesia and/or epidural at patient request  Anticipate     Patient of: [address] [place] [address]  This patient will be an INPATIENT  and I certify the anticipated length of stay is >2 Midnights     Discussed with Dr Melony Campos:    Chief Complaint: contractions, decreased fetal movement, seen recently and both triage and the office, has made change since this morning from 2-4 cm dilated    HPI: Hayden Mena is a 31 y o   with an SADIQ of 2021, by Ultrasound at 39w0d who is being admitted for labor   She complains of uterine contractions, occurring every 10 minutes, has no LOF, and reports no VB  She states there is decreased FM  Robert Marilyn     Pregnancy complications:  Late prenatal care in 3rd trimester    Patient Active Problem List   Diagnosis    Elevated glucose tolerance test    Prenatal care in third trimester    Language barrier    39 weeks gestation of pregnancy       Baby complications/comments: none    Review of Systems    OB History    Para Term  AB Living   2 1 1     1   SAB TAB Ectopic Multiple Live Births           1      # Outcome Date GA Lbr Gallo/2nd Weight Sex Delivery Anes PTL Lv   2 Current            1 Term 02/20/15 38w0d  3600 g (7 lb 15 oz) F Vag-Spont None N CAROL      Birth Comments: BRAZIL       Obstetric Comments   MENSTRUAL CYCLE: 12       Past Medical History:   Diagnosis Date    Anemia     Spontaneous vaginal delivery 02/20/2015       Past Surgical History:   Procedure Laterality Date    NO PAST SURGERIES         Social History     Tobacco Use    Smoking status: Never Smoker    Smokeless tobacco: Never Used   Substance Use Topics    Alcohol use: Not Currently       No Known Allergies    Medications Prior to Admission   Medication    Prenatal MV-Min-Fe Fum-FA-DHA (PRENATAL 1 PO)           OBJECTIVE:  Vitals:  Temp:  [98 °F (36 7 °C)-98 6 °F (37 °C)] 98 6 °F (37 °C)  HR:  [64-81] 77  Resp:  [16-18] 18  BP: (112-123)/(58-73) 112/68  Body mass index is 38 01 kg/m²       Physical Exam:  Constitutional: AAOx3  CV: +S1, +S2, no murmurs appreciated  Resp: No respiratory distress  Abdominal: Gravid uterus  Psychiatric: Behavioral normal    FHT:  Baseline Rate: 140 bpm  Variability: Moderate 6-25 bpm  Accelerations: 15 x 15 or greater, With fetal movment  Decelerations: None  FHR Category: Category I    TOCO:   Contraction Frequency (minutes): Irregular  Contraction Duration (seconds): N/A  Contraction Quality: Not applicable, Mild      Lab Results   Component Value Date    WBC 10 25 (H) 05/20/2021    HGB 11 8 05/20/2021    HCT 35 7 05/20/2021     05/20/2021     Lab Results   Component Value Date    K 3 6 11/23/2020     11/23/2020    CO2 26 11/23/2020    BUN 8 11/23/2020    CREATININE 0 78 11/23/2020    AST 13 11/23/2020    ALT 19 11/23/2020       Prenatal Labs:   Blood Type:   Lab Results   Component Value Date/Time    ABO Grouping A 02/10/2021 11:58 AM     , D (Rh type):   Lab Results   Component Value Date/Time    Rh Factor Positive 02/10/2021 11:58 AM     , Antibody Screen: No results found for: ANTIBODYSCR , HCT/HGB:   Lab Results   Component Value Date/Time    Hematocrit 35 7 05/20/2021 11:21 AM    Hemoglobin 11 8 05/20/2021 11:21 AM      , 1 hour Glucola:   Lab Results   Component Value Date/Time    Glucose 140 (H) 05/20/2021 11:26 AM   , Varicella: No results found for: VARICELLAIGG    , Rubella:   Lab Results   Component Value Date/Time    Rubella IgG Quant 172 8 02/10/2021 11:58 AM        , Chlamydia: No results found for: EXTCHLAMYDIA  , Gonorrhea:   Lab Results   Component Value Date/Time    N gonorrhoeae, DNA Probe Negative 02/11/2021 01:51 PM     , Group B Strep:    Lab Results   Component Value Date/Time    Strep Grp B PCR Negative 06/22/2021 01:56 PM            Pily Smith MD  OB/GYN PGY-1  7/13/2021  5:42 PM        Portions of the record may have been created with voice recognition software  Occasional wrong word or "sound a like" substitutions may have occurred due to the inherent limitations of voice recognition software    Read the chart carefully and recognize, using context, where substitutions have occurred

## 2021-07-13 NOTE — PROGRESS NOTES
L&D Triage Note - OB/GYN  Mirna Matta 31 y o  female MRN: [id number]  Unit/Bed#:  TRIAGE  Encounter: [id number]    [facility]  ASSESSMENT  Mirna Matta is a 31 y o   at 39w0d presenting for rule out labor  She was determined to not yet be in labor but advised to return tomorrow for repeat NST due to 1 possible late deceleration  PLAN  #1  Rule out labor:  · Negative Nitrazine, no ferning, no pooling  · FHT cat 1 with one possible late deceleration which was followed by cat 1 tracing  · BPP 10/10  · F/u tomorrow for repeat NST in triage    #  Discharge instructions  · Patient instructed to call if experiencing worsening contractions, vaginal bleeding, loss of fluid or decreased fetal movement  · Will follow up with OBGYN on 21 for repeat NST  D/w Dr Urmila Mullins  ______________    SUBJECTIVE    SADIQ: Estimated Date of Delivery: 21    HPI:  31 y o  Rosina Root 39w0d presents with complaint of contractions starting at 1 pm on 21 occurring 10 minutes apart and becoming slightly more intense since then but not more frequent  She also described small leakage of clear fluid starting at 9 pm on 21 and occurring several more times  She feels well otherwise and has no complaints  Contractions: yes  Leakage of fluid: yes  Vaginal Bleeding: no  Fetal movement: present    Her obstetrical history is significant for nothing      ROS:  Constitutional: Negative  Respiratory: Negative  Cardiovascular: Negative    Gastrointestinal: Negative    OBJECTIVE:  /61 (BP Location: Right arm)   Pulse 75   Temp 98 °F (36 7 °C) (Temporal)   Resp 18   LMP  (LMP Unknown)   SpO2 97%   There is no height or weight on file to calculate BMI  Physical Exam  Constitutional:       General: She is not in acute distress  Appearance: Normal appearance  She is not ill-appearing     HENT:      Mouth/Throat:      Mouth: Mucous membranes are moist    Eyes:      Pupils: Pupils are equal, round, and reactive to light  Cardiovascular:      Rate and Rhythm: Normal rate  Pulmonary:      Effort: Pulmonary effort is normal    Abdominal:      General: There is no distension  Musculoskeletal:         General: No swelling  Skin:     General: Skin is warm and dry  Coloration: Skin is not jaundiced or pale  Neurological:      General: No focal deficit present  Mental Status: She is alert  Speculum exam:  Cervix visualized but not visibly dilated, no bleeding, pooling, or abnormal discharge  Jorge Clonts:     Infection:   - no clue cells    - no hyphae   - no trichomonads present    Membrane status   - no ferning   - negative nitrazene   - no pooling     SVE:  Position: Unknown  Method: Manual  OB Examiner: Dr Carie Douglas  2/50/-3    FHT:  Baseline Rate: 135 bpm  Variability: Moderate 6-25 bpm  Accelerations: 15 x 15 or greater  Decelerations: None  FHR Category: Category I    TOCO:   Contraction Frequency (minutes): 4-12  Contraction Duration (seconds): 60-70  Contraction Quality: Mild    IMAGING:  BPP 10/10    Labs: No results found for this or any previous visit (from the past 24 hour(s))        Eufemia Amaya MD  OB/GYN PGY-1  7/13/2021  8:31 AM

## 2021-07-13 NOTE — PROGRESS NOTES
L&D Triage Note - OB/GYN  Mirna Matta 31 y o  female MRN: [id number]  Unit/Bed#:  TRIAGE  Encounter: [id number]    Patient is seen by THE [facility]  ASSESSMENT  Mirna Matta is a 31 y o   at 39w0d presenting for rule out labor in the setting of contractions and possible leakage of fluids  PLAN  #1  Rule out labor:  · SVE 2/50/-3 which is unchanged since prior check on 21  · Contractions every 10 minutes  · Plan for 2 hour recheck      D/w Dr Urmila Mullins  ______________    SUBJECTIVE    SADIQ: Estimated Date of Delivery: 21    HPI:  31 y o  Rosina Root 39w0d presents with complaint of contractions which started at 1 pm on 21 and have been occurring every 10 minutes since and are "very intense"  She also describes small leakage of clear fluid starting at 9 pm on 21 with occasional small leaks since  Contractions: yes, every 10 minutes  Leakage of fluid: yes, slow trickle since 9 pm 21  Vaginal Bleeding: no  Fetal movement: present    Her obstetrical history is significant for elevated 1 hr GTT with normal 3 hr GTT      ROS:  Constitutional: Negative  Respiratory: Negative  Cardiovascular: Negative    Gastrointestinal: Negative    OBJECTIVE:  /61 (BP Location: Right arm)   Pulse 75   Temp 98 °F (36 7 °C) (Temporal)   Resp 18   LMP  (LMP Unknown)   SpO2 97%   There is no height or weight on file to calculate BMI  Physical Exam  Constitutional:       General: She is not in acute distress  Appearance: Normal appearance  She is not ill-appearing  Eyes:      Pupils: Pupils are equal, round, and reactive to light  Cardiovascular:      Rate and Rhythm: Normal rate  Pulmonary:      Effort: Pulmonary effort is normal    Abdominal:      General: There is no distension  Musculoskeletal:         General: No swelling  Skin:     General: Skin is warm and dry  Coloration: Skin is not jaundiced or pale  Neurological:      General: No focal deficit present  Mental Status: She is alert  Speculum exam:   Cervix not visibly dilated, no bleeding noted  Galena Folds:     Infection:   - no clue cells    - no hyphae   - no trichomonads present    Membrane status   - no ferning   - no nitrazene   - no pooling     SVE:  2/50/-3  Position: Unknown  Method: Manual  OB Examiner: Dr Shepard Dadds:   cat 1    TOCO:     Contractions every 6-10 minutes      Labs: No results found for this or any previous visit (from the past 24 hour(s))        Storm Taylor MD  OB/GYN PGY-1  7/13/2021  2:19 AM

## 2021-07-14 LAB
BASE EXCESS BLDCOA CALC-SCNC: -2.3 MMOL/L (ref 3–11)
BASE EXCESS BLDCOV CALC-SCNC: -1.3 MMOL/L (ref 1–9)
HCO3 BLDCOA-SCNC: 22.7 MMOL/L (ref 17.3–27.3)
HCO3 BLDCOV-SCNC: 24.2 MMOL/L (ref 12.2–28.6)
O2 CT VFR BLDCOA CALC: 15.7 ML/DL
OXYHGB MFR BLDCOA: 72.4 %
OXYHGB MFR BLDCOV: 70 %
PCO2 BLDCOA: 40.2 MM[HG] (ref 30–60)
PCO2 BLDCOV: 43.2 MM HG (ref 27–43)
PH BLDCOA: 7.37 [PH] (ref 7.23–7.43)
PH BLDCOV: 7.37 [PH] (ref 7.19–7.49)
PO2 BLDCOA: 31.7 MM HG (ref 5–25)
PO2 BLDCOV: 29.4 MM HG (ref 15–45)
RPR SER QL: NORMAL
SAO2 % BLDCOV: 15 ML/DL

## 2021-07-14 PROCEDURE — 0KQM0ZZ REPAIR PERINEUM MUSCLE, OPEN APPROACH: ICD-10-PCS | Performed by: OBSTETRICS & GYNECOLOGY

## 2021-07-14 PROCEDURE — 82805 BLOOD GASES W/O2 SATURATION: CPT | Performed by: OBSTETRICS & GYNECOLOGY

## 2021-07-14 RX ORDER — DIAPER,BRIEF,INFANT-TODD,DISP
1 EACH MISCELLANEOUS 4 TIMES DAILY PRN
Status: DISCONTINUED | OUTPATIENT
Start: 2021-07-14 | End: 2021-07-15 | Stop reason: HOSPADM

## 2021-07-14 RX ORDER — BUPIVACAINE HYDROCHLORIDE 2.5 MG/ML
INJECTION, SOLUTION EPIDURAL; INFILTRATION; INTRACAUDAL AS NEEDED
Status: DISCONTINUED | OUTPATIENT
Start: 2021-07-14 | End: 2021-07-14 | Stop reason: HOSPADM

## 2021-07-14 RX ORDER — CALCIUM CARBONATE 200(500)MG
1000 TABLET,CHEWABLE ORAL DAILY PRN
Status: DISCONTINUED | OUTPATIENT
Start: 2021-07-14 | End: 2021-07-15 | Stop reason: HOSPADM

## 2021-07-14 RX ORDER — OXYCODONE HYDROCHLORIDE AND ACETAMINOPHEN 5; 325 MG/1; MG/1
1 TABLET ORAL EVERY 4 HOURS PRN
Status: DISCONTINUED | OUTPATIENT
Start: 2021-07-14 | End: 2021-07-15 | Stop reason: HOSPADM

## 2021-07-14 RX ORDER — ACETAMINOPHEN 325 MG/1
650 TABLET ORAL EVERY 6 HOURS PRN
Status: DISCONTINUED | OUTPATIENT
Start: 2021-07-14 | End: 2021-07-15 | Stop reason: HOSPADM

## 2021-07-14 RX ORDER — DOCUSATE SODIUM 100 MG/1
100 CAPSULE, LIQUID FILLED ORAL 2 TIMES DAILY
Status: DISCONTINUED | OUTPATIENT
Start: 2021-07-14 | End: 2021-07-15 | Stop reason: HOSPADM

## 2021-07-14 RX ORDER — ONDANSETRON 2 MG/ML
4 INJECTION INTRAMUSCULAR; INTRAVENOUS EVERY 8 HOURS PRN
Status: DISCONTINUED | OUTPATIENT
Start: 2021-07-14 | End: 2021-07-15 | Stop reason: HOSPADM

## 2021-07-14 RX ORDER — IBUPROFEN 600 MG/1
600 TABLET ORAL EVERY 6 HOURS PRN
Status: DISCONTINUED | OUTPATIENT
Start: 2021-07-14 | End: 2021-07-15 | Stop reason: HOSPADM

## 2021-07-14 RX ADMIN — TRANEXAMIC ACID 1000 MG: 1 INJECTION, SOLUTION INTRAVENOUS at 09:10

## 2021-07-14 RX ADMIN — BENZOCAINE AND LEVOMENTHOL: 200; 5 SPRAY TOPICAL at 09:35

## 2021-07-14 RX ADMIN — ROPIVACAINE HYDROCHLORIDE: 2 INJECTION, SOLUTION EPIDURAL; INFILTRATION at 05:30

## 2021-07-14 RX ADMIN — BUPIVACAINE HYDROCHLORIDE 5 ML: 2.5 INJECTION, SOLUTION EPIDURAL; INFILTRATION; INTRACAUDAL at 04:56

## 2021-07-14 RX ADMIN — HYDROCORTISONE 1 APPLICATION: 1 CREAM TOPICAL at 17:08

## 2021-07-14 RX ADMIN — ACETAMINOPHEN 650 MG: 325 TABLET, FILM COATED ORAL at 09:35

## 2021-07-14 RX ADMIN — IBUPROFEN 600 MG: 600 TABLET, FILM COATED ORAL at 13:27

## 2021-07-14 RX ADMIN — IBUPROFEN 600 MG: 600 TABLET, FILM COATED ORAL at 20:00

## 2021-07-14 RX ADMIN — SODIUM CHLORIDE, SODIUM LACTATE, POTASSIUM CHLORIDE, AND CALCIUM CHLORIDE 125 ML/HR: .6; .31; .03; .02 INJECTION, SOLUTION INTRAVENOUS at 06:20

## 2021-07-14 RX ADMIN — SODIUM CHLORIDE, SODIUM LACTATE, POTASSIUM CHLORIDE, AND CALCIUM CHLORIDE 125 ML/HR: .6; .31; .03; .02 INJECTION, SOLUTION INTRAVENOUS at 00:21

## 2021-07-14 RX ADMIN — BENZOCAINE AND LEVOMENTHOL: 200; 5 SPRAY TOPICAL at 17:08

## 2021-07-14 RX ADMIN — DOCUSATE SODIUM 100 MG: 100 CAPSULE, LIQUID FILLED ORAL at 17:08

## 2021-07-14 NOTE — PLAN OF CARE
Problem: PAIN - ADULT  Goal: Verbalizes/displays adequate comfort level or baseline comfort level  Description: Interventions:  - Encourage patient to monitor pain and request assistance  - Assess pain using appropriate pain scale  - Administer analgesics based on type and severity of pain and evaluate response  - Implement non-pharmacological measures as appropriate and evaluate response  - Consider cultural and social influences on pain and pain management  - Notify physician/advanced practitioner if interventions unsuccessful or patient reports new pain  Outcome: Progressing     Problem: INFECTION - ADULT  Goal: Absence or prevention of progression during hospitalization  Description: INTERVENTIONS:  - Assess and monitor for signs and symptoms of infection  - Monitor lab/diagnostic results  - Monitor all insertion sites, i e  indwelling lines, tubes, and drains  - Monitor endotracheal if appropriate and nasal secretions for changes in amount and color  - [place] appropriate cooling/warming therapies per order  - Administer medications as ordered  - Instruct and encourage patient and family to use good hand hygiene technique  - Identify and instruct in appropriate isolation precautions for identified infection/condition  Outcome: Progressing  Goal: Absence of fever/infection during neutropenic period  Description: INTERVENTIONS:  - Monitor WBC    Outcome: Progressing     Problem: SAFETY ADULT  Goal: Patient will remain free of falls  Description: INTERVENTIONS:  - Educate patient/family on patient safety including physical limitations  - Instruct patient to call for assistance with activity   - Consult OT/PT to assist with strengthening/mobility   - Keep Call bell within reach  - Keep bed low and locked with side rails adjusted as appropriate  - Keep care items and personal belongings within reach  - Initiate and maintain comfort rounds  - Make Fall Risk Sign visible to staff  - Offer Toileting every  Hours, in advance of need  - Initiate/Maintain alarm  - Obtain necessary fall risk management equipment:   - Apply yellow socks and bracelet for high fall risk patients  - Consider moving patient to room near nurses station  Outcome: Progressing  Goal: Maintain or return to baseline ADL function  Description: INTERVENTIONS:  -  Assess patient's ability to carry out ADLs; assess patient's baseline for ADL function and identify physical deficits which impact ability to perform ADLs (bathing, care of mouth/teeth, toileting, grooming, dressing, etc )  - Assess/evaluate cause of self-care deficits   - Assess range of motion  - Assess patient's mobility; develop plan if impaired  - Assess patient's need for assistive devices and provide as appropriate  - Encourage maximum independence but intervene and supervise when necessary  - Involve family in performance of ADLs  - Assess for home care needs following discharge   - Consider OT consult to assist with ADL evaluation and planning for discharge  - Provide patient education as appropriate  Outcome: Progressing  Goal: Maintains/Returns to pre admission functional level  Description: INTERVENTIONS:  - Perform BMAT or MOVE assessment daily    - Set and communicate daily mobility goal to care team and patient/family/caregiver  - Collaborate with rehabilitation services on mobility goals if consulted  - Perform Range of Motion  times a day  - Reposition patient every  hours    - Dangle patient  times a day  - Stand patient  times a day  - Ambulate patient  times a day  - Out of bed to chair  times a day   - Out of bed for meals  times a day  - Out of bed for toileting  - Record patient progress and toleration of activity level   Outcome: Progressing     Problem: Knowledge Deficit  Goal: Patient/family/caregiver demonstrates understanding of disease process, treatment plan, medications, and discharge instructions  Description: Complete learning assessment and assess knowledge base   Interventions:  - Provide teaching at level of understanding  - Provide teaching via preferred learning methods  Outcome: Progressing  Goal: Verbalizes understanding of labor plan  Description: Assess patient/family/caregiver's baseline knowledge level and ability to understand information  Provide education via patient/family/caregiver's preferred learning method at appropriate level of understanding  1  Provide teaching at level of understanding  2  Provide teaching via preferred learning method(s)    Outcome: Progressing     Problem: DISCHARGE PLANNING  Goal: Discharge to home or other facility with appropriate resources  Description: INTERVENTIONS:  - Identify barriers to discharge w/patient and caregiver  - Arrange for needed discharge resources and transportation as appropriate  - Identify discharge learning needs (meds, wound care, etc )  - Arrange for interpretive services to assist at discharge as needed  - Refer to Case Management Department for coordinating discharge planning if the patient needs post-hospital services based on physician/advanced practitioner order or complex needs related to functional status, cognitive ability, or social support system  Outcome: Progressing     Problem: POSTPARTUM  Goal: Experiences normal postpartum course  Description: INTERVENTIONS:  - Monitor maternal vital signs  - Assess uterine involution and lochia  Outcome: Progressing  Goal: Appropriate maternal -  bonding  Description: INTERVENTIONS:  - Identify family support  - Assess for appropriate maternal/infant bonding   -Encourage maternal/infant bonding opportunities  - Referral to  or  as needed  Outcome: Progressing  Goal: Establishment of infant feeding pattern  Description: INTERVENTIONS:  - Assess breast/bottle feeding  - Refer to lactation as needed  Outcome: Progressing  Goal: Incision(s), wounds(s) or drain site(s) healing without S/S of infection  Description: INTERVENTIONS  - Assess and document dressing, incision, wound bed, drain sites and surrounding tissue  - Provide patient and family education  - Perform skin care/dressing changes every   Outcome: Progressing

## 2021-07-14 NOTE — ANESTHESIA PROCEDURE NOTES
Epidural Block    Patient location during procedure: OB  Start time: 7/13/2021 9:13 PM  Reason for block: procedure for pain and at surgeon's request  Staffing  Performed: resident/CRNA   Anesthesiologist: Karen Kang MD  Resident/CRNA: Ishmael Golden CRNA  Preanesthetic Checklist  Completed: patient identified, IV checked, risks and benefits discussed, surgical consent, monitors and equipment checked, pre-op evaluation and timeout performed  Epidural  Patient position: sitting  Prep: ChloraPrep  Patient monitoring: frequent blood pressure checks and heart rate  Approach: midline  Injection technique: JOVANNA saline  Needle  Needle type: Tuohy   Needle gauge: 17 G  Catheter type: side hole  Catheter size: 19 G  Catheter at skin depth: 10 cm  Catheter securement method: stabilization device  Test dose: negativelidocaine 1 5% with epinephrine 1:200,000 test dose, 3 mLnegative aspiration for CSF, negative aspiration for heme and no paresthesia on injection  patient tolerated the procedure well with no immediate complications  Additional Notes  Uncomplicated lumbar epidural placement

## 2021-07-14 NOTE — PLAN OF CARE
Problem: PAIN - ADULT  Goal: Verbalizes/displays adequate comfort level or baseline comfort level  Description: Interventions:  - Encourage patient to monitor pain and request assistance  - Assess pain using appropriate pain scale  - Administer analgesics based on type and severity of pain and evaluate response  - Implement non-pharmacological measures as appropriate and evaluate response  - Consider cultural and social influences on pain and pain management  - Notify physician/advanced practitioner if interventions unsuccessful or patient reports new pain  Outcome: Progressing     Problem: INFECTION - ADULT  Goal: Absence or prevention of progression during hospitalization  Description: INTERVENTIONS:  - Assess and monitor for signs and symptoms of infection  - Monitor lab/diagnostic results  - Monitor all insertion sites, i e  indwelling lines, tubes, and drains  - Monitor endotracheal if appropriate and nasal secretions for changes in amount and color  - [place] appropriate cooling/warming therapies per order  - Administer medications as ordered  - Instruct and encourage patient and family to use good hand hygiene technique  - Identify and instruct in appropriate isolation precautions for identified infection/condition  Outcome: Progressing  Goal: Absence of fever/infection during neutropenic period  Description: INTERVENTIONS:  - Monitor WBC    Outcome: Progressing     Problem: SAFETY ADULT  Goal: Patient will remain free of falls  Description: INTERVENTIONS:  - Educate patient/family on patient safety including physical limitations  - Instruct patient to call for assistance with activity   - Consult OT/PT to assist with strengthening/mobility   - Keep Call bell within reach  - Keep bed low and locked with side rails adjusted as appropriate  - Keep care items and personal belongings within reach  - Initiate and maintain comfort rounds  - Make Fall Risk Sign visible to staff  - Offer Toileting every 2 Hours, in advance of need  - Initiate/Maintain baby alarms  - Obtain necessary fall risk management equipment  - Apply yellow socks and bracelet for high fall risk patients  - Consider moving patient to room near nurses station  Outcome: Progressing  Goal: Maintain or return to baseline ADL function  Description: INTERVENTIONS:  -  Assess patient's ability to carry out ADLs; assess patient's baseline for ADL function and identify physical deficits which impact ability to perform ADLs (bathing, care of mouth/teeth, toileting, grooming, dressing, etc )  - Assess/evaluate cause of self-care deficits   - Assess range of motion  - Assess patient's mobility; develop plan if impaired  - Assess patient's need for assistive devices and provide as appropriate  - Encourage maximum independence but intervene and supervise when necessary  - Involve family in performance of ADLs  - Assess for home care needs following discharge   - Consider OT consult to assist with ADL evaluation and planning for discharge  - Provide patient education as appropriate  Outcome: Progressing  Goal: Maintains/Returns to pre admission functional level  Description: INTERVENTIONS:  - Perform BMAT or MOVE assessment daily    - Set and communicate daily mobility goal to care team and patient/family/caregiver  - Collaborate with rehabilitation services on mobility goals if consulted  - Perform Range of Motion 4 times a day  - Reposition patient every 2 hours    - Dangle patient 4 times a day  - Stand patient 4 times a day  - Ambulate patient 4 times a day  - Out of bed to chair 4 times a day   - Out of bed for meals 3 times a day  - Out of bed for toileting  - Record patient progress and toleration of activity level   Outcome: Progressing     Problem: Knowledge Deficit  Goal: Patient/family/caregiver demonstrates understanding of disease process, treatment plan, medications, and discharge instructions  Description: Complete learning assessment and assess knowledge base  Interventions:  - Provide teaching at level of understanding  - Provide teaching via preferred learning methods  Outcome: Progressing  Goal: Verbalizes understanding of labor plan  Description: Assess patient/family/caregiver's baseline knowledge level and ability to understand information  Provide education via patient/family/caregiver's preferred learning method at appropriate level of understanding  1  Provide teaching at level of understanding  2  Provide teaching via preferred learning method(s)  Outcome: Progressing     Problem: DISCHARGE PLANNING  Goal: Discharge to home or other facility with appropriate resources  Description: INTERVENTIONS:  - Identify barriers to discharge w/patient and caregiver  - Arrange for needed discharge resources and transportation as appropriate  - Identify discharge learning needs (meds, wound care, etc )  - Arrange for interpretive services to assist at discharge as needed  - Refer to Case Management Department for coordinating discharge planning if the patient needs post-hospital services based on physician/advanced practitioner order or complex needs related to functional status, cognitive ability, or social support system  Outcome: Progressing     Problem: Labor & Delivery  Goal: Manages discomfort  Description: Assess and monitor for signs and symptoms of discomfort  Assess patient's pain level regularly and per hospital policy  Administer medications as ordered  Support use of nonpharmacological methods to help control pain such as distraction, imagery, relaxation, and application of heat and cold  Collaborate with interdisciplinary team and patient to determine appropriate pain management plan  1  Include patient in decisions related to comfort  2  Offer non-pharmacological pain management interventions  3  Report ineffective pain management to physician  Outcome: Progressing  Goal: Patient vital signs are stable  Description: 1   Assess vital signs - vaginal delivery    Outcome: Progressing     Problem: BIRTH - VAGINAL/ SECTION  Goal: Fetal and maternal status remain reassuring during the birth process  Description: INTERVENTIONS:  - Monitor vital signs  - Monitor fetal heart rate  - Monitor uterine activity  - Monitor labor progression (vaginal delivery)  - DVT prophylaxis  - Antibiotic prophylaxis  Outcome: Progressing  Goal: Emotionally satisfying birthing experience for mother/fetus  Description: Interventions:  - Assess, plan, implement and evaluate the nursing care given to the patient in labor  - Advocate the philosophy that each childbirth experience is a unique experience and support the family's chosen level of involvement and control during the labor process   - Actively participate in both the patient's and family's teaching of the birth process  - Consider cultural, Spiritism and age-specific factors and plan care for the patient in labor  Outcome: Progressing

## 2021-07-14 NOTE — OB LABOR/OXYTOCIN SAFETY PROGRESS
Oxytocin Safety Progress Check Note - [address] 31 y o  female MRN: [id number]    Unit/Bed#: -01 Encounter: [id number]    Dose (rebekah-units/min) Oxytocin: 14 rebekah-units/min  Contraction Frequency (minutes): 1-3  Contraction Quality: Moderate  Tachysystole: No   Cervical Dilation: 6        Cervical Effacement: 80  Fetal Station: -2  Baseline Rate: 150 bpm  Fetal Heart Rate: 169 BPM  FHR Category: Category II               Vital Signs:   Vitals:    07/14/21 0259   BP: 101/53   Pulse: 65   Resp:    Temp:    SpO2:            Notes/comments:   Patient resting comfortably with epidural in place  She has made change and is now 6/80/-2  FHT cat 2 (2 lates at 2:30 am, since then just intermittent variables), contractions every 2-3 minutes  Pit at 15  Dr Lorenzo Leslie aware         Zacarias Wilson MD 7/14/2021 3:16 AM

## 2021-07-14 NOTE — PLAN OF CARE
Problem: PAIN - ADULT  Goal: Verbalizes/displays adequate comfort level or baseline comfort level  Description: Interventions:  - Encourage patient to monitor pain and request assistance  - Assess pain using appropriate pain scale  - Administer analgesics based on type and severity of pain and evaluate response  - Implement non-pharmacological measures as appropriate and evaluate response  - Consider cultural and social influences on pain and pain management  - Notify physician/advanced practitioner if interventions unsuccessful or patient reports new pain  7/14/2021 0903 by Amber Chowdhury RN  Outcome: Progressing  7/14/2021 0717 by Amber Chowdhury RN  Outcome: Progressing     Problem: INFECTION - ADULT  Goal: Absence or prevention of progression during hospitalization  Description: INTERVENTIONS:  - Assess and monitor for signs and symptoms of infection  - Monitor lab/diagnostic results  - Monitor all insertion sites, i e  indwelling lines, tubes, and drains  - Monitor endotracheal if appropriate and nasal secretions for changes in amount and color  - [place] appropriate cooling/warming therapies per order  - Administer medications as ordered  - Instruct and encourage patient and family to use good hand hygiene technique  - Identify and instruct in appropriate isolation precautions for identified infection/condition  7/14/2021 0903 by Amber Chowdhury RN  Outcome: Progressing  7/14/2021 0717 by Amber Chowdhury RN  Outcome: Progressing  Goal: Absence of fever/infection during neutropenic period  Description: INTERVENTIONS:  - Monitor WBC    7/14/2021 0903 by Amber Chowdhury RN  Outcome: Progressing  7/14/2021 0717 by Amber Chowdhury RN  Outcome: Progressing     Problem: SAFETY ADULT  Goal: Patient will remain free of falls  Description: INTERVENTIONS:  - Educate patient/family on patient safety including physical limitations  - Instruct patient to call for assistance with activity   - Consult OT/PT to assist with strengthening/mobility   - Keep Call bell within reach  - Keep bed low and locked with side rails adjusted as appropriate  - Keep care items and personal belongings within reach  - Initiate and maintain comfort rounds  - Make Fall Risk Sign visible to staff  - Offer Toileting every 2 Hours, in advance of need  - Initiate/Maintain baby alarm  - Obtain necessary fall risk management equipment:  - Apply yellow socks and bracelet for high fall risk patients  - Consider moving patient to room near nurses station  7/14/2021 0903 by Bennie De León RN  Outcome: Progressing  7/14/2021 850 [address] by Bennie De León RN  Outcome: Progressing  Goal: Maintain or return to baseline ADL function  Description: INTERVENTIONS:  -  Assess patient's ability to carry out ADLs; assess patient's baseline for ADL function and identify physical deficits which impact ability to perform ADLs (bathing, care of mouth/teeth, toileting, grooming, dressing, etc )  - Assess/evaluate cause of self-care deficits   - Assess range of motion  - Assess patient's mobility; develop plan if impaired  - Assess patient's need for assistive devices and provide as appropriate  - Encourage maximum independence but intervene and supervise when necessary  - Involve family in performance of ADLs  - Assess for home care needs following discharge   - Consider OT consult to assist with ADL evaluation and planning for discharge  - Provide patient education as appropriate  7/14/2021 0903 by Bennie De León RN  Outcome: Progressing  7/14/2021 0717 by Bennie De León RN  Outcome: Progressing  Goal: Maintains/Returns to pre admission functional level  Description: INTERVENTIONS:  - Perform BMAT or MOVE assessment daily    - Set and communicate daily mobility goal to care team and patient/family/caregiver  - Collaborate with rehabilitation services on mobility goals if consulted  - Perform Range of Motion 2 times a day    - Reposition patient every 2 hours   - Dangle patient 4  times a day  - Stand patient 6 times a day  - Ambulate patient 6 times a day  - Out of bed to chair 4 times a day   - Out of bed for meals 3 times a day  - Out of bed for toileting  - Record patient progress and toleration of activity level   7/14/2021 0903 by Tamara Hester RN  Outcome: Progressing  7/14/2021 0717 by Tamara Hester RN  Outcome: Progressing     Problem: Knowledge Deficit  Goal: Patient/family/caregiver demonstrates understanding of disease process, treatment plan, medications, and discharge instructions  Description: Complete learning assessment and assess knowledge base  Interventions:  - Provide teaching at level of understanding  - Provide teaching via preferred learning methods  7/14/2021 0903 by Tamara Hester RN  Outcome: Progressing  7/14/2021 0717 by Tamara Hester RN  Outcome: Progressing  Goal: Verbalizes understanding of labor plan  Description: Assess patient/family/caregiver's baseline knowledge level and ability to understand information  Provide education via patient/family/caregiver's preferred learning method at appropriate level of understanding  1  Provide teaching at level of understanding  2  Provide teaching via preferred learning method(s)    7/14/2021 0903 by Tamara Hester RN  Outcome: Progressing  7/14/2021 0717 by Tamara Hester RN  Outcome: Progressing     Problem: DISCHARGE PLANNING  Goal: Discharge to home or other facility with appropriate resources  Description: INTERVENTIONS:  - Identify barriers to discharge w/patient and caregiver  - Arrange for needed discharge resources and transportation as appropriate  - Identify discharge learning needs (meds, wound care, etc )  - Arrange for interpretive services to assist at discharge as needed  - Refer to Case Management Department for coordinating discharge planning if the patient needs post-hospital services based on physician/advanced practitioner order or complex needs related to functional status, cognitive ability, or social support system  2021 09 by Dorothy Billings RN  Outcome: Progressing  2021 by Dorothy Billings RN  Outcome: Progressing     Problem: POSTPARTUM  Goal: Experiences normal postpartum course  Description: INTERVENTIONS:  - Monitor maternal vital signs  - Assess uterine involution and lochia  Outcome: Progressing  Goal: Appropriate maternal -  bonding  Description: INTERVENTIONS:  - Identify family support  - Assess for appropriate maternal/infant bonding   -Encourage maternal/infant bonding opportunities  - Referral to  or  as needed  Outcome: Progressing  Goal: Establishment of infant feeding pattern  Description: INTERVENTIONS:  - Assess breast/bottle feeding  - Refer to lactation as needed  Outcome: Progressing  Goal: Incision(s), wounds(s) or drain site(s) healing without S/S of infection  Description: INTERVENTIONS  - Assess and document dressing, incision, wound bed, drain sites and surrounding tissue  - Provide patient and family education  - Perform skin care/dressing changes every 2 hrs  Outcome: Progressing     Problem: Labor & Delivery  Goal: Manages discomfort  Description: Assess and monitor for signs and symptoms of discomfort  Assess patient's pain level regularly and per hospital policy  Administer medications as ordered  Support use of nonpharmacological methods to help control pain such as distraction, imagery, relaxation, and application of heat and cold  Collaborate with interdisciplinary team and patient to determine appropriate pain management plan  1  Include patient in decisions related to comfort  2  Offer non-pharmacological pain management interventions  3  Report ineffective pain management to physician    2021 by Dorothy Billings RN  Outcome: Completed  2021 by Dorothy Billings RN  Outcome: Progressing  Goal: Patient vital signs are stable  Description: 1  Assess vital signs - vaginal delivery    2021 by Stephania Lloyd RN  Outcome: Completed  2021 by Stephania Lloyd RN  Outcome: Progressing     Problem: BIRTH - VAGINAL/ SECTION  Goal: Fetal and maternal status remain reassuring during the birth process  Description: INTERVENTIONS:  - Monitor vital signs  - Monitor fetal heart rate  - Monitor uterine activity  - Monitor labor progression (vaginal delivery)  - DVT prophylaxis  - Antibiotic prophylaxis  2021 by Stephania Lloyd RN  Outcome: Completed  2021 by Stephania Lloyd RN  Outcome: Progressing  Goal: Emotionally satisfying birthing experience for mother/fetus  Description: Interventions:  - Assess, plan, implement and evaluate the nursing care given to the patient in labor  - Advocate the philosophy that each childbirth experience is a unique experience and support the family's chosen level of involvement and control during the labor process   - Actively participate in both the patient's and family's teaching of the birth process  - Consider cultural, [place]ism and age-specific factors and plan care for the patient in labor  2021 by Stephania Lloyd RN  Outcome: Completed  2021 by Stephania Lloyd RN  Outcome: Progressing

## 2021-07-14 NOTE — OB LABOR/OXYTOCIN SAFETY PROGRESS
Oxytocin Safety Progress Check Note - [address] 31 y o  female MRN: [id number]    Unit/Bed#: -01 Encounter: [id number]    Dose (rebekah-units/min) Oxytocin: 18 rebekah-units/min  Contraction Frequency (minutes): 1-4  Contraction Quality: Moderate  Tachysystole: No   Cervical Dilation: 8        Cervical Effacement: 80  Fetal Station: -2  Baseline Rate: 135 bpm  Fetal Heart Rate: 139 BPM  FHR Category: Category I               Vital Signs:   Vitals:    07/14/21 0529   BP: 114/76   Pulse: 74   Resp:    Temp:    SpO2:            Notes/comments:   Patient resting comfortably with epidural in place  She has made change to 8/80/-2  FHT showing some minimal variability but still having accelerations and no decelerations, cat II  Contractions every 2-3 minutes  Dr Anu Boyd aware         Alireza Campos MD 7/14/2021 5:47 AM

## 2021-07-14 NOTE — ANESTHESIA POSTPROCEDURE EVALUATION
Post-Op Assessment Note    CV Status:  Stable  Pain Score: 0    Pain management: adequate     Mental Status:  Alert and awake   Hydration Status:  Euvolemic   PONV Controlled:  Controlled   Airway Patency:  Patent      Post Op Vitals Reviewed: Yes        Post-op block assessment: adhesive bandage applied, site cleaned, no complications and catheter intact      No complications documented      BP      Temp      Pulse     Resp      SpO2

## 2021-07-14 NOTE — PLAN OF CARE
Problem: PAIN - ADULT  Goal: Verbalizes/displays adequate comfort level or baseline comfort level  Description: Interventions:  - Encourage patient to monitor pain and request assistance  - Assess pain using appropriate pain scale  - Administer analgesics based on type and severity of pain and evaluate response  - Implement non-pharmacological measures as appropriate and evaluate response  - Consider cultural and social influences on pain and pain management  - Notify physician/advanced practitioner if interventions unsuccessful or patient reports new pain  Outcome: Progressing     Problem: INFECTION - ADULT  Goal: Absence or prevention of progression during hospitalization  Description: INTERVENTIONS:  - Assess and monitor for signs and symptoms of infection  - Monitor lab/diagnostic results  - Monitor all insertion sites, i e  indwelling lines, tubes, and drains  - Monitor endotracheal if appropriate and nasal secretions for changes in amount and color  - [place] appropriate cooling/warming therapies per order  - Administer medications as ordered  - Instruct and encourage patient and family to use good hand hygiene technique  - Identify and instruct in appropriate isolation precautions for identified infection/condition  Outcome: Progressing  Goal: Absence of fever/infection during neutropenic period  Description: INTERVENTIONS:  - Monitor WBC    Outcome: Progressing     Problem: SAFETY ADULT  Goal: Patient will remain free of falls  Description: INTERVENTIONS:  - Educate patient/family on patient safety including physical limitations  - Instruct patient to call for assistance with activity   - Consult OT/PT to assist with strengthening/mobility   - Keep Call bell within reach  - Keep bed low and locked with side rails adjusted as appropriate  - Keep care items and personal belongings within reach  - Initiate and maintain comfort rounds  - Make Fall Risk Sign visible to staff  - Offer Toileting every  Hours, in advance of need  - Initiate/Maintain alarm  - Obtain necessary fall risk management equipment:   - Apply yellow socks and bracelet for high fall risk patients  - Consider moving patient to room near nurses station  Outcome: Progressing  Goal: Maintain or return to baseline ADL function  Description: INTERVENTIONS:  -  Assess patient's ability to carry out ADLs; assess patient's baseline for ADL function and identify physical deficits which impact ability to perform ADLs (bathing, care of mouth/teeth, toileting, grooming, dressing, etc )  - Assess/evaluate cause of self-care deficits   - Assess range of motion  - Assess patient's mobility; develop plan if impaired  - Assess patient's need for assistive devices and provide as appropriate  - Encourage maximum independence but intervene and supervise when necessary  - Involve family in performance of ADLs  - Assess for home care needs following discharge   - Consider OT consult to assist with ADL evaluation and planning for discharge  - Provide patient education as appropriate  Outcome: Progressing  Goal: Maintains/Returns to pre admission functional level  Description: INTERVENTIONS:  - Perform BMAT or MOVE assessment daily    - Set and communicate daily mobility goal to care team and patient/family/caregiver  - Collaborate with rehabilitation services on mobility goals if consulted  - Perform Range of Motion times a day  - Reposition patient every hours    - Dangle patient times a day  - Stand patient times a day  - Ambulate patient times a day  - Out of bed to chair times a day   - Out of bed for meals times a day  - Out of bed for toileting  - Record patient progress and toleration of activity level   Outcome: Progressing     Problem: Knowledge Deficit  Goal: Patient/family/caregiver demonstrates understanding of disease process, treatment plan, medications, and discharge instructions  Description: Complete learning assessment and assess knowledge base   Interventions:  - Provide teaching at level of understanding  - Provide teaching via preferred learning methods  Outcome: Progressing  Goal: Verbalizes understanding of labor plan  Description: Assess patient/family/caregiver's baseline knowledge level and ability to understand information  Provide education via patient/family/caregiver's preferred learning method at appropriate level of understanding  1  Provide teaching at level of understanding  2  Provide teaching via preferred learning method(s)  Outcome: Progressing     Problem: DISCHARGE PLANNING  Goal: Discharge to home or other facility with appropriate resources  Description: INTERVENTIONS:  - Identify barriers to discharge w/patient and caregiver  - Arrange for needed discharge resources and transportation as appropriate  - Identify discharge learning needs (meds, wound care, etc )  - Arrange for interpretive services to assist at discharge as needed  - Refer to Case Management Department for coordinating discharge planning if the patient needs post-hospital services based on physician/advanced practitioner order or complex needs related to functional status, cognitive ability, or social support system  Outcome: Progressing     Problem: Labor & Delivery  Goal: Manages discomfort  Description: Assess and monitor for signs and symptoms of discomfort  Assess patient's pain level regularly and per hospital policy  Administer medications as ordered  Support use of nonpharmacological methods to help control pain such as distraction, imagery, relaxation, and application of heat and cold  Collaborate with interdisciplinary team and patient to determine appropriate pain management plan  1  Include patient in decisions related to comfort  2  Offer non-pharmacological pain management interventions  3  Report ineffective pain management to physician  Outcome: Progressing  Goal: Patient vital signs are stable  Description: 1   Assess vital signs - vaginal delivery    Outcome: Progressing     Problem: BIRTH - VAGINAL/ SECTION  Goal: Fetal and maternal status remain reassuring during the birth process  Description: INTERVENTIONS:  - Monitor vital signs  - Monitor fetal heart rate  - Monitor uterine activity  - Monitor labor progression (vaginal delivery)  - DVT prophylaxis  - Antibiotic prophylaxis  Outcome: Progressing  Goal: Emotionally satisfying birthing experience for mother/fetus  Description: Interventions:  - Assess, plan, implement and evaluate the nursing care given to the patient in labor  - Advocate the philosophy that each childbirth experience is a unique experience and support the family's chosen level of involvement and control during the labor process   - Actively participate in both the patient's and family's teaching of the birth process  - Consider cultural, [place] and age-specific factors and plan care for the patient in labor  Outcome: Progressing

## 2021-07-14 NOTE — L&D DELIVERY NOTE
Obstetrician:  Dr Waldo Spears  Assistant: none    Pre-Delivery Diagnosis:   Single fetus  Term delivery    Post-Delivery Diagnosis:  Same as above - Delivered  Same as above - Delivered      Procedure:   1  Vacuum assisted vaginal  delivery  2  Repair of 2nd degree tear      Anesthesia: epidural    Estimated Blood Loss: 311    Complications:  None  Prolonged first stage    Brief History and Labor Course: Jeri Bustamante is a 31 y o   who was admitted for Contractions  Please see labor timeline for complete labor course  The patient was completely dilated at 0804  She began to push at 0807  Description of Delivery:       Pt spontaneously delivered viable male     With the assistance of maternal expulsive efforts and downward traction of fetal head, the anterior shoulder was delivered without difficulty, followed by the remainder of the infant's body  The infant was placed on the mother's abdomen and after delayed cord clamping, the umbilical cord was doubly clamped and cut  Umbilical cord blood and umbilical artery and venous gases were collected  IV pitocin was started during the 3rd stage to control bleeding  Placenta was delivered with fundal massage and gentle traction on the cord with active management of the third stage of labor  Placenta delivered intact with a peripherally inserted 3-vessel cord  A bimanual was performed and the uterus was noted to be firm and contracted  Inspection of the perineum, vagina, labia, and urethra revealed none and 2nd degree  The laceration was repaired with 2 0 vicryl on a CT needle, using a separate stitch of the same suture for the crown stitch  Good hemostasis was noted  Mother and baby are currently recovering in the LDR in stable condition  All sponge, needle and instrument counts were noted to be correct x2

## 2021-07-14 NOTE — OB LABOR/OXYTOCIN SAFETY PROGRESS
Labor Progress Note - Mirna Lennon 31 y o  female MRN: [id number]    Unit/Bed#: -01 Encounter: [id number]    Dose (rebekah-units/min) Oxytocin: 8 rebekah-units/min  Contraction Frequency (minutes): 2 5-5  Contraction Quality: Moderate  Tachysystole: No   Cervical Dilation: 4        Cervical Effacement: 60  Fetal Station: -2  Baseline Rate: 140 bpm  Fetal Heart Rate: 139 BPM  FHR Category: Category I               Vital Signs:   Vitals:    07/13/21 2333   BP:    Pulse:    Resp: 18   Temp: 97 9 °F (36 6 °C)   SpO2:            Notes/comments:     Cervical exam is unchanged  Patient spontaneously ruptured at 22:40  Pitocin is at 6 rebekah-units/min  Will continue to titrate    FHT: Cat 1: 120/moderate/accelerations, no decelerations    [place]: q3-4 minutes    Imelda Urena MD 7/13/2021 11:46 PM

## 2021-07-15 VITALS
TEMPERATURE: 99 F | DIASTOLIC BLOOD PRESSURE: 56 MMHG | HEIGHT: 60 IN | BODY MASS INDEX: 38.21 KG/M2 | RESPIRATION RATE: 18 BRPM | HEART RATE: 80 BPM | SYSTOLIC BLOOD PRESSURE: 99 MMHG | WEIGHT: 194.6 LBS | OXYGEN SATURATION: 98 %

## 2021-07-15 PROCEDURE — 99024 POSTOP FOLLOW-UP VISIT: CPT | Performed by: OBSTETRICS & GYNECOLOGY

## 2021-07-15 RX ORDER — DIAPER,BRIEF,INFANT-TODD,DISP
1 EACH MISCELLANEOUS 4 TIMES DAILY PRN
Qty: 30 G | Refills: 0
Start: 2021-07-15

## 2021-07-15 RX ORDER — IBUPROFEN 600 MG/1
600 TABLET ORAL EVERY 6 HOURS PRN
Qty: 30 TABLET | Refills: 0
Start: 2021-07-15

## 2021-07-15 RX ORDER — ACETAMINOPHEN 325 MG/1
650 TABLET ORAL EVERY 6 HOURS PRN
Refills: 0
Start: 2021-07-15

## 2021-07-15 RX ADMIN — OXYCODONE HYDROCHLORIDE AND ACETAMINOPHEN 1 TABLET: 5; 325 TABLET ORAL at 00:35

## 2021-07-15 NOTE — PLAN OF CARE
Problem: PAIN - ADULT  Goal: Verbalizes/displays adequate comfort level or baseline comfort level  Description: Interventions:  - Encourage patient to monitor pain and request assistance  - Assess pain using appropriate pain scale  - Administer analgesics based on type and severity of pain and evaluate response  - Implement non-pharmacological measures as appropriate and evaluate response  - Consider cultural and social influences on pain and pain management  - Notify physician/advanced practitioner if interventions unsuccessful or patient reports new pain  Outcome: Progressing     Problem: INFECTION - ADULT  Goal: Absence or prevention of progression during hospitalization  Description: INTERVENTIONS:  - Assess and monitor for signs and symptoms of infection  - Monitor lab/diagnostic results  - Monitor all insertion sites, i e  indwelling lines, tubes, and drains  - Monitor endotracheal if appropriate and nasal secretions for changes in amount and color  - [place] appropriate cooling/warming therapies per order  - Administer medications as ordered  - Instruct and encourage patient and family to use good hand hygiene technique  - Identify and instruct in appropriate isolation precautions for identified infection/condition  Outcome: Progressing  Goal: Absence of fever/infection during neutropenic period  Description: INTERVENTIONS:  - Monitor WBC    Outcome: Progressing     Problem: SAFETY ADULT  Goal: Patient will remain free of falls  Description: INTERVENTIONS:  - Educate patient/family on patient safety including physical limitations  - Instruct patient to call for assistance with activity   - Consult OT/PT to assist with strengthening/mobility   - Keep Call bell within reach  - Keep bed low and locked with side rails adjusted as appropriate  - Keep care items and personal belongings within reach  - Initiate and maintain comfort rounds  Outcome: Progressing  Goal: Maintain or return to baseline ADL function  Description: INTERVENTIONS:  -  Assess patient's ability to carry out ADLs; assess patient's baseline for ADL function and identify physical deficits which impact ability to perform ADLs (bathing, care of mouth/teeth, toileting, grooming, dressing, etc )  - Assess/evaluate cause of self-care deficits   - Assess range of motion  - Assess patient's mobility; develop plan if impaired  - Assess patient's need for assistive devices and provide as appropriate  - Encourage maximum independence but intervene and supervise when necessary  - Involve family in performance of ADLs  - Assess for home care needs following discharge   - Consider OT consult to assist with ADL evaluation and planning for discharge  - Provide patient education as appropriate  Outcome: Progressing  Goal: Maintains/Returns to pre admission functional level  Description: INTERVENTIONS:  - Perform BMAT or MOVE assessment daily    - Set and communicate daily mobility goal to care team and patient/family/caregiver     - Collaborate with rehabilitation services on mobility goals if consulted  - Out of bed for toileting  - Record patient progress and toleration of activity level   Outcome: Progressing     Problem: DISCHARGE PLANNING  Goal: Discharge to home or other facility with appropriate resources  Description: INTERVENTIONS:  - Identify barriers to discharge w/patient and caregiver  - Arrange for needed discharge resources and transportation as appropriate  - Identify discharge learning needs (meds, wound care, etc )  - Arrange for interpretive services to assist at discharge as needed  - Refer to Case Management Department for coordinating discharge planning if the patient needs post-hospital services based on physician/advanced practitioner order or complex needs related to functional status, cognitive ability, or social support system  Outcome: Progressing     Problem: POSTPARTUM  Goal: Experiences normal postpartum course  Description: INTERVENTIONS:  - Monitor maternal vital signs  - Assess uterine involution and lochia  Outcome: Progressing  Goal: Appropriate maternal -  bonding  Description: INTERVENTIONS:  - Identify family support  - Assess for appropriate maternal/infant bonding   -Encourage maternal/infant bonding opportunities  - Referral to  or  as needed  Outcome: Progressing  Goal: Establishment of infant feeding pattern  Description: INTERVENTIONS:  - Assess breast/bottle feeding  - Refer to lactation as needed  Outcome: Progressing  Goal: Incision(s), wounds(s) or drain site(s) healing without S/S of infection  Description: INTERVENTIONS  - Assess and document dressing, incision, wound bed, drain sites and surrounding tissue  - Provide patient and family education  Outcome: Progressing     Problem: Knowledge Deficit  Goal: Patient/family/caregiver demonstrates understanding of disease process, treatment plan, medications, and discharge instructions  Description: Complete learning assessment and assess knowledge base  Interventions:  - Provide teaching at level of understanding  - Provide teaching via preferred learning methods  Outcome: Completed  Goal: Verbalizes understanding of labor plan  Description: Assess patient/family/caregiver's baseline knowledge level and ability to understand information  Provide education via patient/family/caregiver's preferred learning method at appropriate level of understanding  1  Provide teaching at level of understanding  2  Provide teaching via preferred learning method(s)    Outcome: Completed

## 2021-07-15 NOTE — PROGRESS NOTES
Progress Note - OB/GYN   Mirna Matta 31 y o  female MRN: [id number]  Unit/Bed#:  303-01 Encounter: [id number]       This encounter was conducted in [address] [place] [address] as the patient understands both [place] and Serbian    Assessment:  PPD#1 s/p Spontaneous Vaginal Delivery, stable    Plan:    1) Postpartum care  Encourage ambulation   Encourage breastfeeding  Continue current meds   2) Late to prenatal care   UDS wnl on admission to L&D  3) Contraception   Will schedule Mirena IUD 6 wks postpartum   Self pay, plans to use [facility] program  4) Disposition   Anticipate discharge home today if baby is cleared to go    Subjective/Objective     Subjective:     Pain: no  Tolerating PO: yes  Voiding: yes  Flatus: yes  BM: yes  Ambulating: yes  Breastfeeding: Breastfeeding  Chest pain: no  Shortness of breath: no  Leg pain: no  Lochia: Minimal    Objective:     Vitals:  Vitals:    07/14/21 1552 07/14/21 1949 07/15/21 0023 07/15/21 0406   BP: 93/53 102/51 122/60 95/52   BP Location: Left arm Left arm     Pulse: 77 85 62 81   Resp: 18 19 20 20   Temp: 98 5 °F (36 9 °C) 98 5 °F (36 9 °C) 98 6 °F (37 °C) 98 4 °F (36 9 °C)   TempSrc: Oral Oral Oral Oral   SpO2: 98% 98% 99% 98%   Weight:       Height:           Physical Exam:   GEN: appears well, alert and oriented x 3, pleasant and cooperative   CV: +S1, +S2, no murmurs/rubs/gallops appreciated  RESP: no labored breathing  ABDOMEN: soft, no tenderness, no distention, Uterine fundus firm and non-tender, -1 cm below the umbilicus   EXTREMITIES: non-tender  NEURO Alert and oriented to person, place, and time         Lab Results   Component Value Date    WBC 11 38 (H) 07/13/2021    HGB 12 4 07/13/2021    HCT 37 1 07/13/2021    MCV 94 07/13/2021     07/13/2021         Jim Hogg Serum, DO, PGY-2  Obstetrics & Gynecology  07/15/21

## 2021-07-15 NOTE — DISCHARGE SUMMARY
uncontrolled pain  On day of discharge she was ambulating, voiding spontaneously, tolerating oral intake and hemodynamically stable  Mom's blood type is A positive and  Rhogam was not given  Disposition: Home    Planned Readmission: No    Discharge Medications:   Prenatal vitamin daily for 6 months or the duration of nursing, whichever is longer  Motrin 600 mg orally every 6 hours as needed for pain  Tylenol (over the counter) per bottle directions as needed for pain  Hydrocortisone cream 1% (over the counter) applied 1-2x daily to perineum as needed  Witch hazel pads for perineal discomfort as needed      Discharge instructions :   -Do not place anything (no partner, tampons or douche) in your vagina for 6 weeks  -You may walk for exercise for the first 6 weeks then gradually return to your usual activities    -Please do not drive for 1 week if you have no stitches and for 2 weeks if you have stitches or underwent a  delivery     -You may take baths or shower per your preference    -Please look at your bust (breasts) in the mirror daily and call your doctor for redness or tenderness or increased warmth  - If you have had a  section please look at your incision daily as well and call provider for increasing redness or steady drainage from the incision    -Please call your doctor's office if temperature > 100 4*F or 38* C, worsening pain or a foul discharge  Follow Up:  - Follow up in 3 weeks for postpartum visit    Frances Root DO  Obstetrics & Gynecology PGY-2  7/15/2021  6:51 AM

## 2021-07-15 NOTE — LACTATION NOTE
This note was copied from a baby's chart  CONSULT - LACTATION  Baby Boy (Mirna) Kaylee Benson 1 days male MRN: [id number]    [facility] NURSERY Room / Bed: (N)/(N) Encounter: [id number]    Maternal Information     MOTHER:  Eran Benavidez  Maternal Age: 31 y o    OB History: # 1 - Date: 02/20/15, Sex: Female, Weight: 3600 g (7 lb 15 oz), GA: 38w0d, Delivery: Vaginal, Spontaneous, Apgar1: None, Apgar5: None, Living: Living, Birth Comments: [place]     # 2 - Date: 07/14/21, Sex: Male, Weight: 3705 g (8 lb 2 7 oz), GA: 39w1d, Delivery: Vaginal, Spontaneous, Apgar1: 9, Apgar5: 9, Living: Living, Birth Comments: None   Previouse breast reduction surgery? No    Lactation history:   Has patient previously breast fed: Yes   How long had patient previously breast fed: 2 years   Previous breast feeding complications: None     Past Surgical History:   Procedure Laterality Date    NO PAST SURGERIES          Birth information:  YOB: 2022   Time of birth: 6:15 AM   Sex: male   Delivery type: Vaginal, Spontaneous   Birth Weight: 3705 g (8 lb 2 7 oz)   Percent of Weight Change: -3%     Gestational Age: 36w3d     Feeding recommendations:  breast feed on demand     Mirna reports that Tami Rojas is breastfeeding well  she  her first baby for 2 years  She reports having a double electric breast pump for home use  She says that there is temporary pain when [employer], but that it does not last long  Mirna speaks Indonesian exclusively  [employer]  [id number] used for education and history  Liliam Davey says she knows how to hand express  Discussed feeding cues and highlights of Ready, Set, Baby Booklet     Met with mother to go over discharge breastfeeding booklet including the feeding log   Emphasized 8 or more (12) feedings in a 24 hour period, what to expect for the number of diapers per day of life and the progression of properties of the  stooling pattern  Reviewed breastfeeding and your lifestyle, storage and preparation of breast milk, how to keep you breast pump clean, the employed breastfeeding mother and paced bottle feeding handouts  Booklet included Breastfeeding Resources for after discharge including access to the number for the [id number]  Discussed s/s engorgement, blocked milk ducts, and mastitis  Discussed how to remedy at home and when to contact physician  Encouraged parents to call for assistance, questions, and concerns about breastfeeding  Extension provided    Lovely Ma RN 7/15/2021 6:12 PM

## 2021-07-15 NOTE — PLAN OF CARE
Problem: PAIN - ADULT  Goal: Verbalizes/displays adequate comfort level or baseline comfort level  Description: Interventions:  - Encourage patient to monitor pain and request assistance  - Assess pain using appropriate pain scale  - Administer analgesics based on type and severity of pain and evaluate response  - Implement non-pharmacological measures as appropriate and evaluate response  - Consider cultural and social influences on pain and pain management  - Notify physician/advanced practitioner if interventions unsuccessful or patient reports new pain  Outcome: Adequate for Discharge     Problem: INFECTION - ADULT  Goal: Absence or prevention of progression during hospitalization  Description: INTERVENTIONS:  - Assess and monitor for signs and symptoms of infection  - Monitor lab/diagnostic results  - Monitor all insertion sites, i e  indwelling lines, tubes, and drains  - Monitor endotracheal if appropriate and nasal secretions for changes in amount and color  - [place] appropriate cooling/warming therapies per order  - Administer medications as ordered  - Instruct and encourage patient and family to use good hand hygiene technique  - Identify and instruct in appropriate isolation precautions for identified infection/condition  Outcome: Adequate for Discharge  Goal: Absence of fever/infection during neutropenic period  Description: INTERVENTIONS:  - Monitor WBC    Outcome: Adequate for Discharge     Problem: SAFETY ADULT  Goal: Patient will remain free of falls  Description: INTERVENTIONS:  - Educate patient/family on patient safety including physical limitations  - Instruct patient to call for assistance with activity   - Consult OT/PT to assist with strengthening/mobility   - Keep Call bell within reach  - Keep bed low and locked with side rails adjusted as appropriate  - Keep care items and personal belongings within reach  - Initiate and maintain comfort rounds    Goal: Maintain or return to baseline ADL function  Description: INTERVENTIONS:  -  Assess patient's ability to carry out ADLs; assess patient's baseline for ADL function and identify physical deficits which impact ability to perform ADLs (bathing, care of mouth/teeth, toileting, grooming, dressing, etc )  - Assess/evaluate cause of self-care deficits   - Assess range of motion  - Assess patient's mobility; develop plan if impaired  - Assess patient's need for assistive devices and provide as appropriate  - Encourage maximum independence but intervene and supervise when necessary  - Involve family in performance of ADLs  - Assess for home care needs following discharge   - Consider OT consult to assist with ADL evaluation and planning for discharge  - Provide patient education as appropriate  Outcome: Adequate for Discharge  Goal: Maintains/Returns to pre admission functional level  Description: INTERVENTIONS:    - Set and communicate daily mobility goal to care team and patient/family/caregiver       - Out of bed for toileting  - Record patient progress and toleration of activity level   Outcome: Adequate for Discharge     Problem: DISCHARGE PLANNING  Goal: Discharge to home or other facility with appropriate resources  Description: INTERVENTIONS:  - Identify barriers to discharge w/patient and caregiver  - Arrange for needed discharge resources and transportation as appropriate  - Identify discharge learning needs (meds, wound care, etc )  - Arrange for interpretive services to assist at discharge as needed  - Refer to Case Management Department for coordinating discharge planning if the patient needs post-hospital services based on physician/advanced practitioner order or complex needs related to functional status, cognitive ability, or social support system  Outcome: Adequate for Discharge     Problem: POSTPARTUM  Goal: Experiences normal postpartum course  Description: INTERVENTIONS:  - Monitor maternal vital signs  - Assess uterine involution and lochia  Outcome: Adequate for Discharge  Goal: Appropriate maternal -  bonding  Description: INTERVENTIONS:  - Identify family support  - Assess for appropriate maternal/infant bonding   -Encourage maternal/infant bonding opportunities  - Referral to  or  as needed  Outcome: Adequate for Discharge  Goal: Establishment of infant feeding pattern  Description: INTERVENTIONS:  - Assess breast/bottle feeding  - Refer to lactation as needed  Outcome: Adequate for Discharge  Goal: Incision(s), wounds(s) or drain site(s) healing without S/S of infection  Description: INTERVENTIONS  - Assess and document dressing, incision, wound bed, drain sites and surrounding tissue  - Provide patient and family education  - Perform skin care/dressing changes everyday  Outcome: Adequate for Discharge

## 2021-07-20 LAB — PLACENTA IN STORAGE: NORMAL

## 2021-08-13 ENCOUNTER — POSTPARTUM VISIT (OUTPATIENT)
Dept: OBGYN CLINIC | Facility: CLINIC | Age: 26
End: 2021-08-13

## 2021-08-13 VITALS
BODY MASS INDEX: 35.14 KG/M2 | HEART RATE: 76 BPM | TEMPERATURE: 98.2 F | WEIGHT: 179 LBS | DIASTOLIC BLOOD PRESSURE: 71 MMHG | HEIGHT: 60 IN | SYSTOLIC BLOOD PRESSURE: 101 MMHG

## 2021-08-13 DIAGNOSIS — K59.00 CONSTIPATION, UNSPECIFIED CONSTIPATION TYPE: ICD-10-CM

## 2021-08-13 PROCEDURE — 99213 OFFICE O/P EST LOW 20 MIN: CPT | Performed by: NURSE PRACTITIONER

## 2021-08-13 RX ORDER — DOCUSATE SODIUM 100 MG/1
100 CAPSULE, LIQUID FILLED ORAL 2 TIMES DAILY
Qty: 10 CAPSULE | Refills: 0 | Status: SHIPPED | OUTPATIENT
Start: 2021-08-13

## 2021-08-13 NOTE — PATIENT INSTRUCTIONS
POSTPARTUM    You should contact your OBGYN provider if you experience any of the followin  Bleeding that soaks a pad every hour for 2 hours  2  Foul odor coming from your vagina  3  Fever of 100 4 or higher  4  Incision or abdominal pain that will not go away despite prescribed pain medications  5  Swelling, redness, discharge or bleeding from your  incision or perineal tear site  6  Your incision begins to separate  7  Problems urinating including inability to urinate, burning while urinating or extremely dark urine  8  No bowel movement within 4 days of giving birth, or difficulty with bowel movements after that  9  Any type of visual disturbance (double vision, blurring, etc)  10  Severe headache  11  Flu-like symptoms  12  Pain or redness in one or both of your breasts  13  Pain, warmth, tenderness or swelling in your legs, especially the calf area  14  Frequent nausea and vomiting  15  Signs of depression or anxiety  16  If you experience chest pains or have problems breathing, call 911 immediately  In general you may resume sexual vaginal intercourse after 6 weeks of delivery  It is important to continue changing your sanitary pads frequently and clean the perineum at least 2-3 times daily  Keep abdominal incision after  delivery clean and dry at all times  DESPUÉS DEL PARTO      Debe comunicarse con luna proveedor de GINECÓLOGO si usted experimenta cualquiera de los siguientes:  1  sangrado que empapa marco almohadilla cada hora lisandro 2 horas  2  mal olor procedente de la vagina  3  fiebre de 100 4 o superior  4  incisión o dolor abdominal que no irá lejos a pesar de prescribe medicamentos para el dolor  5  hinchazón, enrojecimiento, secreción o sangrado de la incisión de cesárea o sitio de desgarro perineal   6  la incisión se comienza a separar    7  problemas para orinar incluyendo incapacidad para orinar, ardor al [place] o muy oscura de la orina   8  no movimiento intestinal dentro de 4 días de jakub a jerald, o la dificultad con las deposiciones después de eso  9  cualquier tipo de disturbio visual (visión doble, Desenfoque, etcetera)  10  cefalea  11  gripe-sonia síntomas  12  dolor o enrojecimiento en laith o ambos de torsten pechos  13  dolor, calor, sensibilidad o hinchazón en las piernas, especialmente la sangita de la pantorrilla  14  frecuentes náuseas y vómitos  15  signos de depresión o ansiedad  16  Si experimenta marbella de pecho o tiene problemas para respirar, llame al 911 inmediatamente  En general puede reanudar el coito sexual después de 6 semanas de la entrega  Es importante continuar cambiando tus toallas sanitarias con frecuencia y limpiar el perineo al menos 2 - 3 veces al día  Mantenga la incisión abdominal después de entrega cesariana limpio y seco en todo momento

## 2021-08-13 NOTE — PROGRESS NOTES
POSTPARTUM VISIT    Mirna Palomares presents today for postpartum visit  She had a vaginal delivery on [birth year]97/[birth year]  Complications included a vacuum assisted vaginal delivery and repair of a second degree tear  She is breast feeding her infant and reports no issues with such  She desires mirena IUD for contraception  She was provided with Misael Snyder Depression Screening tool and her score was 3  Review of Systems:   -Constitutional: denies issues, denies pain   -Breasts: denies tenderness   -Gynecologic: lochia   -Urinary: denies issues urinating   -GI: stools WNL, reports constipation    Physical Exam:   -Vitals: There were no vitals filed for this visit  -General: A&Ox3, no acute distress noted   -Abdomen: soft, non-tender   -Extremities: nontender, no edema noted   -Breasts: deferred    -Pelvic exam: deferred   Assessment/Plan:  1  Normal postpartum exam   2  Depression screening negative  3  Last pap smear was done 2/2021 and result was NILM  Advise return for next annual GYN exam in 2/2022  4  Contraception: Desires Mirena IUD  ARCH forms completed  Will return for insertion when device is available  5  Rx Colace for constipation  Encouraged to increase hydration and fiber intake

## 2021-08-16 ENCOUNTER — PATIENT OUTREACH (OUTPATIENT)
Dept: OBGYN CLINIC | Facility: CLINIC | Age: 26
End: 2021-08-16

## 2021-08-25 ENCOUNTER — PATIENT OUTREACH (OUTPATIENT)
Dept: OBGYN CLINIC | Facility: CLINIC | Age: 26
End: 2021-08-25

## 2021-08-30 PROBLEM — Z30.9 CONTRACEPTION MANAGEMENT: Status: ACTIVE | Noted: 2021-08-30

## 2021-08-30 PROCEDURE — 58300 INSERT INTRAUTERINE DEVICE: CPT | Performed by: OBSTETRICS & GYNECOLOGY

## 2021-08-30 NOTE — PROGRESS NOTES
Procedure Visit     SUBJECTIVE:  Giovanny Wagner is a 31 y o  [id number] female who is s/p  on 2021 after presenting to L&D in labor at 4 cm dilation  Her pregnancy was complicated by elevated 1 hr GTT (3 hr GTT WNL)  Labor course was notable for SROM and epidural use  Delivery was complicated by second degree perineal tear  Postpartum visit was 2021  She was breastfeeding, and EPDS was 3  Since last visit, no changes  LMP: no period since delivery   Feeding: breast   Sexually active: not since before delivery     OBJECTIVE:  Vitals:    21 1420   BP: 108/74   Pulse: 78       Physical Exam  Vitals reviewed  Constitutional:       Appearance: Normal appearance  She is obese  She is not ill-appearing or diaphoretic  HENT:      Head: Normocephalic and atraumatic  Mouth/Throat:      Mouth: Mucous membranes are moist       Pharynx: Oropharynx is clear  Eyes:      Conjunctiva/sclera: Conjunctivae normal       Pupils: Pupils are equal, round, and reactive to light  Cardiovascular:      Rate and Rhythm: Normal rate  Pulmonary:      Effort: Pulmonary effort is normal  No respiratory distress  Abdominal:      General: Abdomen is flat  There is no distension  Palpations: Abdomen is soft  Tenderness: There is no abdominal tenderness  Genitourinary:     Comments: External genitalia, vaginal mucosa, and cervix normal in appearance  Cervix normal in appearance  Musculoskeletal:      Cervical back: Normal range of motion  Neurological:      Mental Status: She is alert           Recent Results (from the past 12 hour(s))   POCT urine HCG    Collection Time: 21  2:42 PM   Result Value Ref Range    URINE HCG Negative          Iud insertions    Date/Time: 2021 6:13 PM  Performed by: Cristi Fry MD  Authorized by: Cristi Fry MD   Universal Protocol:  Procedure performed by: (Dr Lizzie Pitts was present for the procedure)  Consent: Verbal consent obtained  Written consent obtained  Risks and benefits: risks, benefits and alternatives were discussed  Consent given by: patient  Patient understanding: patient states understanding of the procedure being performed  Patient consent: the patient's understanding of the procedure matches consent given  Procedure consent: procedure consent matches procedure scheduled  Relevant documents: relevant documents present and verified  Test results: test results available and properly labeled  Site marked: the operative site was not marked  Radiology Images displayed and confirmed  If images not available, report reviewed: imaging studies not available  Patient identity confirmed: verbally with patient        Procedure:     Pelvic exam performed: yes      Negative urine pregnancy test: yes      Cervix cleaned and prepped: yes      Speculum placed in vagina: yes      Tenaculum applied to cervix: yes      Uterus sounded: yes      Uterus sound depth (cm):  8    IUD inserted with no complications: yes      IUD type:  Mirena    Strings trimmed: yes (3-4 cm outside cervix)    Post-procedure:     Patient tolerated procedure well: yes      Patient will follow up after next period: yes    Comments:      Patient tolerated procedure well  No complications  ASSESSMENT AND PLAN:  Kade Robertson is a 31 y o  [id number] recently postpartum female who presents for IUD insertion as her from of postpartum contraception  Patient tolerated procedure well    - RTC 3 weeks for IUD string check   - Schedule next annual visit   - Continue routine postpartum care     Dr Jac Bautista was present for the procedure       Minus MD Dat  PGY-3, OBGYN  08/31/21

## 2021-08-30 NOTE — PATIENT INSTRUCTIONS
Dispositivo intrauterino   INSTRUCCIONES SOBRE [facility]:   Busque atención médica de inmediato si:  · Siente mucho dolor o sangra mucho lisandro luna período menstrual     · Tiene fiebre y dolor abdominal lamont  Llame a luna médico o ginecólogo si:  · Usted arash estar embarazada  · El DIU se ha salido  · Usted tiene sangrado de la vagina después de las relaciones sexuales, y no corresponde al período  · Usted tiene [address]  · No puede encontrar el cordón del DIU, el cordón se siente más hazel o siente el plástico del DIU  · Tiene flujo vaginal de color verdoso, amarillento o con mal olor  · Usted tiene preguntas o inquietudes acerca de luna condición o cuidado  Medicamentos:  · [place], pueden disminuir la inflamación y el dolor o la [place]  Brea medicamento está disponible con o sin marco receta médica  Los SHERIDAN pueden causar sangrado estomacal o problemas renales en ciertas personas  Si usted cherie un medicamento anticoagulante, siempre pregúntele a luna médico si los SHERIDAN son seguros para usted  Siempre aysha la etiqueta de brea medicamento y [place] instrucciones  · [place] torsten medicamentos sonia se le haya indicado  Consulte con luna médico si usted arash que luna medicamento no le está ayudando o si presenta efectos secundarios  Infórmele si es alérgico a cualquier medicamento  Mantenga marco lista actualizada de los [place], las vitaminas y los productos herbales que cherie  Incluya los siguientes datos de los medicamentos: cantidad, frecuencia y motivo de administración  Traiga con usted la lista o los envases de las píldoras a torsten citas de seguimiento  Lleve la lista de los medicamentos con usted en mauri de marco emergencia  Cuidados personales:  · Aplique marco compresa caliente para aliviar el dolor y los calambres  Use marco almohadilla eléctrica de baja temperatura   Aplique calor en la parte baja del abdomen lisandro 20 minutos cada hora, o sonia se lo indicaron  · Regrese a torsten actividades según se lo indiquen  Luna médico le dirá cuándo puede volver a trabajar, regresar a la escuela o hacer otras actividades  · No use tampón ni tenga relaciones sexuales hasta que luna médico la autorice  Asegúrese de que el DIU esté en luna sitio: El DIU tiene un cordón de hilo de plástico  [place] o 2 pulgadas (2 a 5 cm) de stephen cordón cuelgan en la vagina  El cordón no se puede inna y no le causará ningún problema cuando tenga relaciones sexuales  Compruebe que el cordón se encuentre en luna sitio cada 3 días lisandro los 3 primeros meses de [facility]  Pasado stephen período, compruebe que el cordón esté en luna sitio después de cada menstruación  Santos lo siguiente para comprobar que el DIU está en luna lugar:  · [employer] las herrera con [place] y agua tibia  Séqueselas con marco toalla limpia  · Doble las rodillas y póngase en cuclillas cerca del suelo  · Introduzca con cuidado el dedo índice dentro de luna vagina  El lauren uterino está en la parte superior de la vagina y se siente sonia la punta de la nariz  Busque el cordón del dispositivo  No jale del cordón  Usted no debería sentir la parte de plástico jose del DIU    · [employer] las herrera después de revisar el cordón del dispositivo  Para más información:  · [facility] [place] , [address] [place]  Phone: [phone]  Web Address: https://Diarize/  org    Acuda a torsten consultas de control con luna médico según le indicaron  Anote torsten preguntas para que se acuerde de hacerlas lisandro torsten visitas  © Copyright [employer] 2021 Information is for End User's use only and may not be sold, redistributed or otherwise used for commercial purposes  All illustrations and images included in CareNotes® are the copyrighted property of [employer]  or [address] [place] [address] es sólo para uso en educación   Luna intención no es darle un consejo [facility] [employer] o tratamientos  Colsulte con luna [employer] farmacéutico antes de seguir cualquier régimen médico para saber si es seguro y efectivo para usted

## 2021-08-31 ENCOUNTER — PROCEDURE VISIT (OUTPATIENT)
Dept: OBGYN CLINIC | Facility: CLINIC | Age: 26
End: 2021-08-31

## 2021-08-31 ENCOUNTER — PATIENT OUTREACH (OUTPATIENT)
Dept: OBGYN CLINIC | Facility: CLINIC | Age: 26
End: 2021-08-31

## 2021-08-31 VITALS
HEIGHT: 60 IN | WEIGHT: 178 LBS | SYSTOLIC BLOOD PRESSURE: 108 MMHG | HEART RATE: 78 BPM | DIASTOLIC BLOOD PRESSURE: 74 MMHG | BODY MASS INDEX: 34.95 KG/M2

## 2021-08-31 DIAGNOSIS — Z30.430 ENCOUNTER FOR INSERTION OF INTRAUTERINE CONTRACEPTIVE DEVICE (IUD): Primary | ICD-10-CM

## 2021-08-31 LAB — SL AMB POCT URINE HCG: NEGATIVE

## 2021-08-31 PROCEDURE — 81025 URINE PREGNANCY TEST: CPT | Performed by: OBSTETRICS & GYNECOLOGY

## 2021-08-31 NOTE — PROGRESS NOTES
MAYELIN COLIN met with Pt and Provider to assist with interpretation during appointment  Pt seen for IUD Mirena insertion  Consent and procedure explained, Pt verbalized understanding  Pt reported she is doing well  Baby is feeding by breast  Pt denies any post partum signs  Pt denies any question or concern  Will return to clinic in 3 weeks for IUD check up

## 2021-09-20 ENCOUNTER — OFFICE VISIT (OUTPATIENT)
Dept: OBGYN CLINIC | Facility: CLINIC | Age: 26
End: 2021-09-20

## 2021-09-20 VITALS
HEIGHT: 60 IN | BODY MASS INDEX: 34.36 KG/M2 | WEIGHT: 175 LBS | HEART RATE: 91 BPM | SYSTOLIC BLOOD PRESSURE: 126 MMHG | DIASTOLIC BLOOD PRESSURE: 61 MMHG

## 2021-09-20 DIAGNOSIS — Z30.431 ENCOUNTER FOR ROUTINE CHECKING OF INTRAUTERINE CONTRACEPTIVE DEVICE (IUD): Primary | ICD-10-CM

## 2021-09-20 PROCEDURE — 99213 OFFICE O/P EST LOW 20 MIN: CPT | Performed by: NURSE PRACTITIONER

## 2021-09-20 NOTE — PROGRESS NOTES
PROBLEM GYNECOLOGICAL VISIT    Mirna Coyne is a 31 y o  female who presents today for and IUD check  Her general medical history has been reviewed and she reports it as follows:    Past Medical History:   Diagnosis Date    Anemia     Spontaneous vaginal delivery 2015    Varicella     had vaccine     Past Surgical History:   Procedure Laterality Date    NO PAST SURGERIES       OB History        2    Para   2    Term   2            AB        Living   2       SAB        TAB        Ectopic        Multiple   0    Live Births   2           Obstetric Comments   MENSTRUAL CYCLE: 12           Social History     Tobacco Use    Smoking status: Never Smoker    Smokeless tobacco: Never Used   Vaping Use    Vaping Use: Never used   Substance Use Topics    Alcohol use: Not Currently    Drug use: Never       Current Outpatient Medications   Medication Instructions    acetaminophen (TYLENOL) 650 mg, Oral, Every 6 hours PRN    docusate sodium (COLACE) 100 mg, Oral, 2 times daily    hydrocortisone 1 % cream 1 application, Topical, 4 times daily PRN    ibuprofen (MOTRIN) 600 mg, Oral, Every 6 hours PRN    Prenatal MV-Min-Fe Fum-FA-DHA (PRENATAL 1 PO) 1 tablet, Oral, Daily       History of Present Illness: Clive Lee presents today for an IUD check  She had a Mirena IUD placed on 21 for contraceptive purposes  Denies pain or bleeding  She has no complaints, is happy with this method and would like to continue  Review of Systems:  Review of Systems   Constitutional: Negative  Gastrointestinal: Negative  Genitourinary: Negative  Skin: Negative  Physical Exam:  /61   Pulse 91   Ht 5' (1 524 m)   Wt 79 4 kg (175 lb)   BMI 34 18 kg/m²   Physical Exam  Constitutional:       General: She is not in acute distress  Appearance: Normal appearance     Genitourinary:      Vulva, inguinal canal, vagina, cervix and uterus normal       IUD strings visualized  Neurological:      Mental Status: She is alert  Psychiatric:         Mood and Affect: Mood normal          Behavior: Behavior normal    Vitals reviewed  Assessment:   1  Routine checking of IUD    2  Mirena IUD in place    Plan:   1  IUD is in place  2  Return for annual exam in February or PRN

## 2022-09-19 ENCOUNTER — ANNUAL EXAM (OUTPATIENT)
Dept: OBGYN CLINIC | Facility: CLINIC | Age: 27
End: 2022-09-19

## 2022-09-19 VITALS
WEIGHT: 161.4 LBS | DIASTOLIC BLOOD PRESSURE: 72 MMHG | HEIGHT: 60 IN | HEART RATE: 67 BPM | SYSTOLIC BLOOD PRESSURE: 115 MMHG | BODY MASS INDEX: 31.69 KG/M2

## 2022-09-19 DIAGNOSIS — N76.0 BV (BACTERIAL VAGINOSIS): ICD-10-CM

## 2022-09-19 DIAGNOSIS — B96.89 BV (BACTERIAL VAGINOSIS): ICD-10-CM

## 2022-09-19 DIAGNOSIS — Z01.419 WOMEN'S ANNUAL ROUTINE GYNECOLOGICAL EXAMINATION: ICD-10-CM

## 2022-09-19 DIAGNOSIS — Z12.39 ENCOUNTER FOR BREAST CANCER SCREENING USING NON-MAMMOGRAM MODALITY: ICD-10-CM

## 2022-09-19 DIAGNOSIS — Z13.31 POSITIVE DEPRESSION SCREENING: ICD-10-CM

## 2022-09-19 DIAGNOSIS — Z11.3 SCREENING FOR STD (SEXUALLY TRANSMITTED DISEASE): Primary | ICD-10-CM

## 2022-09-19 LAB
BV WHIFF TEST VAG QL: POSITIVE
CLUE CELLS SPEC QL WET PREP: POSITIVE
PH SMN: 5 [PH]
SL AMB POCT WET MOUNT: ABNORMAL
T VAGINALIS VAG QL WET PREP: NEGATIVE
YEAST VAG QL WET PREP: NEGATIVE

## 2022-09-19 PROCEDURE — 99395 PREV VISIT EST AGE 18-39: CPT | Performed by: NURSE PRACTITIONER

## 2022-09-19 PROCEDURE — 87491 CHLMYD TRACH DNA AMP PROBE: CPT | Performed by: NURSE PRACTITIONER

## 2022-09-19 PROCEDURE — 87591 N.GONORRHOEAE DNA AMP PROB: CPT | Performed by: NURSE PRACTITIONER

## 2022-09-19 PROCEDURE — 87210 SMEAR WET MOUNT SALINE/INK: CPT | Performed by: NURSE PRACTITIONER

## 2022-09-19 RX ORDER — METRONIDAZOLE 500 MG/1
500 TABLET ORAL 2 TIMES DAILY
Qty: 14 TABLET | Refills: 0 | Status: SHIPPED | OUTPATIENT
Start: 2022-09-19 | End: 2022-09-26

## 2022-09-20 LAB
C TRACH DNA SPEC QL NAA+PROBE: NEGATIVE
N GONORRHOEA DNA SPEC QL NAA+PROBE: NEGATIVE

## 2023-01-06 ENCOUNTER — OFFICE VISIT (OUTPATIENT)
Dept: DENTISTRY | Facility: CLINIC | Age: 28
End: 2023-01-06

## 2023-01-06 DIAGNOSIS — Z01.20 VISIT FOR DENTAL EXAMINATION: Primary | ICD-10-CM

## 2023-01-06 NOTE — DENTAL PROCEDURE DETAILS
Comprehensive Exam, FMX, Perio charting  Reviewed hhx  ASA I  Pt speaks Slovenian: ID #[id number] and [id number]    CC: "I have a broken tooth on the lower left and sensitivity on the right "    FMX obtained    Perio charting completed  Perio findings: generalized 1-3 mm pockets  Pt tx planned for prophy  Dr Marie Adams did exam:  Decay present: yes  #4-MO  #5-DO  #17-occ (has class I mobility)  #29-buccal (direct buccal)  #31-occ (open margin)    Watch placed: #28-D, #29-M    Extraction: #19- root tips remaining    Pt mentioned she has a "Hard bite" recommended OTC  for now until all restorative needs are completed, then consider a custom guard afterwards  Pt dismissed in good health, no complications and all questions answered  Ara N[place], [facility]     NV: ext #19- root tips  NV2: kale  NV3: start on restorative needs

## 2023-03-09 ENCOUNTER — OFFICE VISIT (OUTPATIENT)
Dept: DENTISTRY | Facility: CLINIC | Age: 28
End: 2023-03-09

## 2023-03-09 VITALS — TEMPERATURE: 97.8 F | SYSTOLIC BLOOD PRESSURE: 109 MMHG | HEART RATE: 79 BPM | DIASTOLIC BLOOD PRESSURE: 61 MMHG

## 2023-03-09 DIAGNOSIS — K08.89 NONRESTORABLE TOOTH: Primary | ICD-10-CM

## 2023-03-09 NOTE — PROGRESS NOTES
Universal Protocol    Other Assisting Provider: Yes, Betty(assistant)    Verbal consent obtained? YES  Written consent obtained? YES    Risks, benefits and alternatives discussed?: YES    Consent given by: Patient    Time Out  Immediately prior to the procedure a time out was called: YES    Time Out:  1:10    A time out verifies correct patient, procedure, equipment, support staff and site/side marked as required  Patient states understanding of procedure being performed: YES    Patient's understanding of procedure matches consent: YES    Procedure consent matches procedure scheduled: YES    Test results available and properly labeled: N/A    Site  Verified with the patient  YES    Radiology Images displayed and confirmed  If images not available, report reviewed:  YES    Required items - Required blood products, implants, devices and special equipment available: YES    Patient identity confirmed:  YES    Oral Surgery    Mirna Goetzsummer Funk presents for Ext #19 root tips  [address], patient denies any changes  Pt  Is ASA2  Obtained a direct and personal consent  Risks and complications were explained  Pt agreed and consented  Consent scanned in doc center  Pre-Op BP WNL  Administered 1 carp 4% articaine w 1:100k epi via infiltration  Adequate anesthesia obtained, reflected gingiva, elevated, and extracted #19  Socket irrigated, no sutures  Upon dismissal, patient received POI & gauze      NV: resins

## 2023-03-14 ENCOUNTER — OFFICE VISIT (OUTPATIENT)
Dept: DENTISTRY | Facility: CLINIC | Age: 28
End: 2023-03-14

## 2023-03-14 VITALS — DIASTOLIC BLOOD PRESSURE: 80 MMHG | TEMPERATURE: 98.7 F | SYSTOLIC BLOOD PRESSURE: 120 MMHG | HEART RATE: 74 BPM

## 2023-03-14 DIAGNOSIS — Z01.21 ENCOUNTER FOR DENTAL EXAMINATION AND CLEANING WITH ABNORMAL FINDINGS: Primary | ICD-10-CM

## 2023-03-14 NOTE — DENTAL PROCEDURE DETAILS
Prophylaxis completed with ultrasonic  and hand instrumentation  Soft plaque removed and supragingival calculus removed  Polished with prophy cup and paste  Flossed and provided Oral Health Instructions  Demonstrated proper brushing and flossing technique  Patient left satisfied and ambulatory  ADULT JUSTINO Zhao translation: [id number]  REVIEWED MED HX: meds, allergies, health changes reviewed in EPIC  CHIEF CONCERN:  Patient feeling sensitivity to cold temp on #30 B, patient pointed  Also feeling sore on LL area where #19 root tips were just extracted  Upon examination, ulceration noted on the lingual tissue of extraction site of #19  Recommend warm salt water rinses and avoid sharp crunchy foods that could irritate that area further  Also recommend trying sensodyne toothpaste for LR sensitivity to cold temp  Will have doctor evaluate that area when she returns for #29B resin  PAIN SCALE:  0  ASA CLASS:  II  PLAQUE:  mild   CALCULUS:  heavy  BLEEDING:   heavy gen  STAIN :   light  ORAL HYGIENE:  fair   PERIO: no perio present    Hand scaled, polished and flossed  Used Cavitron  Oral Hygiene Instruction:  recommended brushing 2 x daily for 2 minutes MIN, recommended flossing daily, reviewed dietary precautions  Visual and Tactile Intraoral/ Extraoral evaluation: Oral and Oropharyngeal cancer evaluation  No findings     Next Visit: #29 B, check #30 B as well    Next Recall: 6 month recall     Last [employer]/ FMX :  1-6-23

## 2023-03-15 ENCOUNTER — OFFICE VISIT (OUTPATIENT)
Dept: DENTISTRY | Facility: CLINIC | Age: 28
End: 2023-03-15

## 2023-03-15 VITALS — SYSTOLIC BLOOD PRESSURE: 107 MMHG | DIASTOLIC BLOOD PRESSURE: 73 MMHG | HEART RATE: 82 BPM | TEMPERATURE: 97.8 F

## 2023-03-15 DIAGNOSIS — K02.9 CARIES: Primary | ICD-10-CM

## 2023-03-16 NOTE — PROGRESS NOTES
Pt presents to the clinic for a composite restoration on #4 MO, 5 DO  H&P updated and vitals recorded  ASA: II  Pain: 0/10     Oral Cancer Screening Completed  WNL     20% Benzocaine topical LA applied to the injection site  Administered 1 carp 4% articaine 1:100k epi via buccal and lingual infiltration  Discussed with patient need for RCT if pulp exposure occurs or in future if pulp is inflamed  Pt understands and consents  Prepped teeth #4 and #5 with 245 carbide on high speed  Caries/ Existing restoration removed with round carbide on slow speed  Isolation with cotton rolls and dri-angles  Parmar sectional matrix system placed and wedged  Selectively etched enamel with 37% H2PO4, rinse, dry  Applied Adhese with 20 second scrub once, gentle air dry and light cured for 10s  Restored with flowable and Tetric bulk rigo shade A2 n light-cured increments  Refined with finishing burs, polished with enhance point  Verified occlusion and contacts  Pt left satisfied  Post op instructions given       NV: resins 29 B, 30 O

## 2023-03-29 ENCOUNTER — OFFICE VISIT (OUTPATIENT)
Dept: DENTISTRY | Facility: CLINIC | Age: 28
End: 2023-03-29

## 2023-03-29 VITALS — HEART RATE: 91 BPM | TEMPERATURE: 96.7 F | SYSTOLIC BLOOD PRESSURE: 103 MMHG | DIASTOLIC BLOOD PRESSURE: 72 MMHG

## 2023-03-29 DIAGNOSIS — K02.9 CARIES: Primary | ICD-10-CM

## 2023-03-29 NOTE — PROGRESS NOTES
Pt presents to the clinic for a composite restoration on #29 B and 31 O  H&P updated and vitals recorded  ASA: II   Pain: 0/10      Oral Cancer Screening Completed  WNL      20% Benzocaine topical LA applied to the injection site  Administered 1 carp 2% lido 1:100k epi via IANB block and long buccal nerve block and 1 carp 4% articaine 1:100k epi via buccal and lingual infiltration  Discussed with patient need for RCT if pulp exposure occurs or in future if pulp is inflamed  Pt understands and consents  Prepped tooth #29 and 31with 245 carbide on high speed  Caries/ Existing restoration removed with round carbide on slow speed  Isolation with cotton rolls and dri-angles  Selectively etched enamel with 37% H2PO4, rinse, dry  Applied Adhese with 20 second scrub once, gentle air dry and light cured for 10s  Restored with flowable and Tetric bulk rigo shade A2 in light-cured increments  Refined with finishing burs, polished with enhance point  Verified occlusion and contacts  Pt left satisfied  Post op instructions given       NV: 17 O resin